# Patient Record
Sex: MALE | Race: WHITE | Employment: OTHER | URBAN - METROPOLITAN AREA
[De-identification: names, ages, dates, MRNs, and addresses within clinical notes are randomized per-mention and may not be internally consistent; named-entity substitution may affect disease eponyms.]

---

## 2017-01-05 ENCOUNTER — OFFICE VISIT (OUTPATIENT)
Dept: UROLOGY | Facility: CLINIC | Age: 67
End: 2017-01-05

## 2017-01-05 VITALS
SYSTOLIC BLOOD PRESSURE: 131 MMHG | DIASTOLIC BLOOD PRESSURE: 77 MMHG | HEART RATE: 100 BPM | WEIGHT: 219 LBS | HEIGHT: 70 IN | BODY MASS INDEX: 31.35 KG/M2

## 2017-01-05 DIAGNOSIS — C61 MALIGNANT NEOPLASM OF PROSTATE (H): Primary | ICD-10-CM

## 2017-01-05 ASSESSMENT — PAIN SCALES - GENERAL: PAINLEVEL: NO PAIN (0)

## 2017-01-05 NOTE — PROGRESS NOTES
Chief Complaint:    Localized Prostate Cancer         Consult or Referral:     Mr. David Astudillo is a 66 year old male seen in consultation from Dr. Vargas.         History of Present Illness:    David Astudillo is a very pleasant 66 year old male who presents with a history of Prostate Cancer.   The clinical stage is cT1c, initial PSA was 6 and biopsy jorge score is 3+3 in 1 of 12 cores.  Volume of core(s) affected by prostate cancer ranged from 3%.  The prostate size was measured and estimated to be to be 46 grams.  PSA density is 0.13.             Past Medical History:     Past Medical History   Diagnosis Date     Cardiomyopathy, idiopathic (H) 11/6/2012     ICD (implantable cardiac defibrillator), dual, in situ 11/6/2012     CRT-D 99% V-pacing     Paroxysmal VT (H) 11/6/2012     History of VT ablation x2     Stroke (H)      2004     VALE (obstructive sleep apnea)      Anxiety      Dyslipidemia      GERD (gastroesophageal reflux disease)      Atrial fibrillation (H)      Paroxysmal     Thyroid nodule      awaiting FNA     Nonischemic cardiomyopathy (H)      LVAD (left ventricular assist device) present (H)             Past Surgical History:     Past Surgical History   Procedure Laterality Date     Insert ventricular assist device left (heartmate ii)  11/21/2012     Procedure: INSERT VENTRICULAR ASSIST DEVICE LEFT (HEARTMATE II);  Median Sternotomy, Insert Heartmate II Left Ventricular Assist Device, On-pump oxygenator, Intraoperative Transesophageal Echocardiogram;  Surgeon: Chris Paulino MD;  Location: [facility]     Irrigation and debridement chest washout, combined  11/14/2013     Procedure: COMBINED IRRIGATION AND DEBRIDEMENT CHEST WASHOUT;  Irrigation and Debridement of Chest Wound;  Surgeon: Chris Paulino MD;  Location: [facility]     Esophagoscopy, gastroscopy, duodenoscopy (egd), combined  11/21/2013     Procedure: COMBINED ESOPHAGOSCOPY, GASTROSCOPY, DUODENOSCOPY (EGD), REMOVE FOREIGN BODY;;  Surgeon:  Mati Lala MD;  Location: [facility]     Incision and drainage sternum w/ irrigation system, combined  3/13/2014     Procedure: COMBINED INCISION AND DRAINAGE STERNUM W/ IRRIGATION SYSTEM;  Incision and Drainage of Abdominal Abcess;  Surgeon: Chris Paulino MD;  Location: [facility]     Irrigation and debridement trunk, combined  2014     Procedure: COMBINED IRRIGATION AND DEBRIDEMENT TRUNK;  Irrigation and Debridement of Sternal Wound;  Surgeon: Chris Paulino MD;  Location: [facility]     Implant automatic implantable cardioverter defibrillator              Medications     Current Outpatient Prescriptions   Medication     levofloxacin (LEVAQUIN) 500 MG tablet     Tacrolimus (PROGRAF PO)     mycophenolate (CELLCEPT - GENERIC EQUIVALENT) 500 MG tablet     PROGRAF 1 MG PO CAPSULE     sulfamethoxazole-trimethoprim (BACTRIM) 400-80 MG per tablet     CELLCEPT 500 MG PO TABLET     PRAVASTATIN SODIUM PO     ALPRAZolam (XANAX) 0.5 MG tablet     zolpidem (AMBIEN) 10 MG tablet     Multiple Vitamin (MULTIVITAMINS PO)     aspirin EC 81 MG tablet     loratadine (CLARITIN) 10 MG tablet     No current facility-administered medications for this visit.            Family History:     Family History   Problem Relation Age of Onset     Thyroid Disease No family hx of      Heart Failure       aunt, also had ICD,  at age 82     Lupus No family hx of      DIABETES No family hx of      Parkinsonism Father             Social History:     Social History     Social History     Marital Status:      Spouse Name: N/A     Number of Children: N/A     Years of Education: N/A     Occupational History     Not on file.     Social History Main Topics     Smoking status: Former Smoker -- 1.00 packs/day for 31 years     Types: Cigarettes     Start date: 1973     Quit date: 2004     Smokeless tobacco: Former User     Alcohol Use: No      Comment: a glass of wine with dinner sometimes     Drug Use: No     Sexual Activity: Not on file  "    Other Topics Concern     Not on file     Social History Narrative            Allergies:   Review of patient's allergies indicates no known allergies.         Review of Systems:  From intake questionnaire     Negative 14 system review except as noted on HPI, nurse's note.         Physical Exam:     Patient is a 66 year old  male   Vitals: Blood pressure 131/77, pulse 100, height 1.778 m (5' 10\"), weight 99.338 kg (219 lb).  General Appearance Adult: Alert, no acute distress, oriented  HENT: throat/mouth:normal, good dentition  Lungs: no respiratory distress, or pursed lip breathing  Heart: No obvious jugular venous distension present  Abdomen: soft, nontender, no organomegaly or masses, Body mass index is 31.42 kg/(m^2).      Labs and Pathology:    I reviewed all applicable laboratory and pathology data and went over findings with patient  Significant for   PSA   Date Value Ref Range Status   11/10/2016 6.03* 0 - 4 ug/L Final     Comment:     Assay Method:  Chemiluminescence using Siemens Vista analyzer   11/16/2012 1.11 0 - 4 ug/L Final     Comment:     PSA results are about 7% lower than our prior method due to a methodology   change   on August 30, 2011.           Imaging:    I reviewed all applicable imaging and went over findings with patient.  Significant for n/a           Assessment and Plan:     Assessment:   Localized Prostate Cancer    Plan:  We had an extensive discussion about the significance of localized, prostate cancer.  His estimated risk of extraprostatic disease is less than 2% therefore no additional diagnostic imaging is indicated at this time.     We discussed the options for treatment of a localized prostate cancer including active surveillance (reviewing the [place] experience), brachytherapy, external beam, and  proton beam radiation therapy, as well as surgical extirpation.  We briefly mentioned cryotherapy, but the results are not great in the primary setting and they almost uniformly " lead to loss of erections.  We discussed the fact that all prostate cancer treatments leads to the loss or decrease in sexual function.  This loss usually occurs immediately with surgery and then improves as the patient heals and with radiation there is usually no effect, then it drops off at a faster than expected pace such that 2-3 years down the road, the number of men suffering from ED after treatment for their prostate cancer is approximately equal.    We also discussed the advantages and disadvantages and roles of open surgery vs. laparoscopic (and Da Luna assisted) surgery.  I noted that though robotic surgery has been associated with shorter hospitalization, shorter time to complete recovery, fewer blood transfusions and lower risk of bladder neck contractures, in the most important domains, cancer control, preservation of urinary function and preservation of sexual function, the outcomes have been quite comparable.    Given the low risk 3+3 and small volume of his prostate cancer 5% of one core.  Some physicians debate whether this should still be called prostate cancer due to the low risk nature.  There is a 15 year 97% life expectancy without active treatment at this time.    We did discuss treatment options.    The anticipated post-operative course was explained, including the anticipated hospital stay.  With surgery we discussed the need for a catheter post-operatively for between 7-10 days to serve as a scaffolding for the bladder neck to heal to the urethra.  We also discussed the risk of post operative urinary incontinence once the cathter is removed.  We discussed that we would recommend pelvic floor physical therapy and that sometimes urinary recovery takes several months to up to a year to recover control.  Approximately 90-95% of men are continent at one year after surgery, but most men describe that their continence is different after surgery.    I suggested a referral to radiation oncology  which I believe is helpful to get adequate information to make the best decision that is best for the patient.  He is not interested in a radiation consult because he's leaning heavily towards Active surveillance.  We discussed national survey results suggesting that for identical patients, surgeons and radiation oncologists suggest their respective method of treating prostate cancer as the most appropriate the majority of the time.  But for most cases of prostate cancer there appears to be clinical equipoise between these approaches and this allows the patient preferences to be expressed.    Patient has decided he would like to proceed with active surveillance including a MRI in 6 months along with a PSA. Would also plan an additional biopsy in 1 year.    F/U:   In june      Adalid Selby MD  Department of Urology Staff  [facility]  Patient Care Team:  Amadeo Vargas MD as PCP - General (Cardiology)  Ann Lezama MSW as   Aleah Coronado, RN as VAD Coordinator (Nurse)  Dhruv Yao MD as MD (Clinical Cardiac Electrophysiology)  Kaitlynn Lester, ROLLY as Nurse Coordinator (Clinical Cardiac Electrophysiology)  Candis Cuadra MD as Cardiologist (Cardiology)  Arpita Madden RN as Registered Nurse (Cardiology)  Adalid Selby MD as MD (Urology)  AMADEO VARGAS    Copy to patient  JAMIE WELLINGTON  [address] [facility] [place] [address]

## 2017-01-05 NOTE — PATIENT INSTRUCTIONS
Follow up with Dr. Selby in 6 months with prostate MRI.    It was a pleasure meeting with you today.  Thank you for allowing me and my team the privilege of caring for you today.  YOU are the reason we are here, and I truly hope we provided you with the excellent service you deserve.  Please let us know if there is anything else we can do for you so that we can be sure you are leaving completely satisfied with your care experience.      JÚNIOR Marroquin

## 2017-01-05 NOTE — NURSING NOTE
Chief Complaint   Patient presents with     RECHECK     discuss results of prostate biopsy        Ann Tran MA

## 2017-01-05 NOTE — Clinical Note
1/5/2017       RE: David Astudillo  [address]  [place] [facility] [address]     Dear Colleague,    Thank you for referring your patient, David Astudillo, to the [facility] UROLOGY AND INST FOR PROSTATE AND UROLOGIC CANCERS at [facility]. Please see a copy of my visit note below.          Chief Complaint:    Localized Prostate Cancer         Consult or Referral:     Mr. David Astudillo is a 66 year old male seen in consultation from Dr. Vargas.         History of Present Illness:    David Astudillo is a very pleasant 66 year old male who presents with a history of Prostate Cancer.   The clinical stage is cT1c, initial PSA was 6 and biopsy jorge score is 3+3 in 1 of 12 cores.  Volume of core(s) affected by prostate cancer ranged from 3%.  The prostate size was measured and estimated to be to be 46 grams.  PSA density is 0.13.             Past Medical History:     Past Medical History   Diagnosis Date     Cardiomyopathy, idiopathic (H) 11/6/2012     ICD (implantable cardiac defibrillator), dual, in situ 11/6/2012     CRT-D 99% V-pacing     Paroxysmal VT (H) 11/6/2012     History of VT ablation x2     Stroke (H)      2004     VALE (obstructive sleep apnea)      Anxiety      Dyslipidemia      GERD (gastroesophageal reflux disease)      Atrial fibrillation (H)      Paroxysmal     Thyroid nodule      awaiting FNA     Nonischemic cardiomyopathy (H)      LVAD (left ventricular assist device) present (H)             Past Surgical History:     Past Surgical History   Procedure Laterality Date     Insert ventricular assist device left (heartmate ii)  11/21/2012     Procedure: INSERT VENTRICULAR ASSIST DEVICE LEFT (HEARTMATE II);  Median Sternotomy, Insert Heartmate II Left Ventricular Assist Device, On-pump oxygenator, Intraoperative Transesophageal Echocardiogram;  Surgeon: Chris Paulino MD;  Location: [facility]     Irrigation and debridement chest washout, combined  11/14/2013     Procedure:  COMBINED IRRIGATION AND DEBRIDEMENT CHEST WASHOUT;  Irrigation and Debridement of Chest Wound;  Surgeon: Chris Paulino MD;  Location: [facility]     Esophagoscopy, gastroscopy, duodenoscopy (egd), combined  2013     Procedure: COMBINED ESOPHAGOSCOPY, GASTROSCOPY, DUODENOSCOPY (EGD), REMOVE FOREIGN BODY;;  Surgeon: Mati Lala MD;  Location: [facility]     Incision and drainage sternum w/ irrigation system, combined  3/13/2014     Procedure: COMBINED INCISION AND DRAINAGE STERNUM W/ IRRIGATION SYSTEM;  Incision and Drainage of Abdominal Abcess;  Surgeon: Chris Paulino MD;  Location: [facility]     Irrigation and debridement trunk, combined  2014     Procedure: COMBINED IRRIGATION AND DEBRIDEMENT TRUNK;  Irrigation and Debridement of Sternal Wound;  Surgeon: Chris Paulino MD;  Location: [facility]     Implant automatic implantable cardioverter defibrillator              Medications     Current Outpatient Prescriptions   Medication     levofloxacin (LEVAQUIN) 500 MG tablet     Tacrolimus (PROGRAF PO)     mycophenolate (CELLCEPT - GENERIC EQUIVALENT) 500 MG tablet     PROGRAF 1 MG PO CAPSULE     sulfamethoxazole-trimethoprim (BACTRIM) 400-80 MG per tablet     CELLCEPT 500 MG PO TABLET     PRAVASTATIN SODIUM PO     ALPRAZolam (XANAX) 0.5 MG tablet     zolpidem (AMBIEN) 10 MG tablet     Multiple Vitamin (MULTIVITAMINS PO)     aspirin EC 81 MG tablet     loratadine (CLARITIN) 10 MG tablet     No current facility-administered medications for this visit.            Family History:     Family History   Problem Relation Age of Onset     Thyroid Disease No family hx of      Heart Failure       aunt, also had ICD,  at age 82     Lupus No family hx of      DIABETES No family hx of      Parkinsonism Father             Social History:     Social History     Social History     Marital Status:      Spouse Name: N/A     Number of Children: N/A     Years of Education: N/A     Occupational History     Not on file.     Social  "History Main Topics     Smoking status: Former Smoker -- 1.00 packs/day for 31 years     Types: Cigarettes     Start date: 01/01/1973     Quit date: 01/01/2004     Smokeless tobacco: Former User     Alcohol Use: No      Comment: a glass of wine with dinner sometimes     Drug Use: No     Sexual Activity: Not on file     Other Topics Concern     Not on file     Social History Narrative            Allergies:   Review of patient's allergies indicates no known allergies.         Review of Systems:  From intake questionnaire     Negative 14 system review except as noted on HPI, nurse's note.         Physical Exam:     Patient is a 66 year old  male   Vitals: Blood pressure 131/77, pulse 100, height 1.778 m (5' 10\"), weight 99.338 kg (219 lb).  General Appearance Adult: Alert, no acute distress, oriented  HENT: throat/mouth:normal, good dentition  Lungs: no respiratory distress, or pursed lip breathing  Heart: No obvious jugular venous distension present  Abdomen: soft, nontender, no organomegaly or masses, Body mass index is 31.42 kg/(m^2).      Labs and Pathology:    I reviewed all applicable laboratory and pathology data and went over findings with patient  Significant for   PSA   Date Value Ref Range Status   11/10/2016 6.03* 0 - 4 ug/L Final     Comment:     Assay Method:  Chemiluminescence using Siemens Vista analyzer   11/16/2012 1.11 0 - 4 ug/L Final     Comment:     PSA results are about 7% lower than our prior method due to a methodology   change   on August 30, 2011.           Imaging:    I reviewed all applicable imaging and went over findings with patient.  Significant for n/a           Assessment and Plan:     Assessment:   Localized Prostate Cancer    Plan:  We had an extensive discussion about the significance of localized, prostate cancer.  His estimated risk of extraprostatic disease is less than 2% therefore no additional diagnostic imaging is indicated at this time.     We discussed the options for " treatment of a localized prostate cancer including active surveillance (reviewing the [place] experience), brachytherapy, external beam, and  proton beam radiation therapy, as well as surgical extirpation.  We briefly mentioned cryotherapy, but the results are not great in the primary setting and they almost uniformly lead to loss of erections.  We discussed the fact that all prostate cancer treatments leads to the loss or decrease in sexual function.  This loss usually occurs immediately with surgery and then improves as the patient heals and with radiation there is usually no effect, then it drops off at a faster than expected pace such that 2-3 years down the road, the number of men suffering from ED after treatment for their prostate cancer is approximately equal.    We also discussed the advantages and disadvantages and roles of open surgery vs. laparoscopic (and Da Luna assisted) surgery.  I noted that though robotic surgery has been associated with shorter hospitalization, shorter time to complete recovery, fewer blood transfusions and lower risk of bladder neck contractures, in the most important domains, cancer control, preservation of urinary function and preservation of sexual function, the outcomes have been quite comparable.    Given the low risk 3+3 and small volume of his prostate cancer 5% of one core.  Some physicians debate whether this should still be called prostate cancer due to the low risk nature.  There is a 15 year 97% life expectancy without active treatment at this time.    We did discuss treatment options.    The anticipated post-operative course was explained, including the anticipated hospital stay.  With surgery we discussed the need for a catheter post-operatively for between 7-10 days to serve as a scaffolding for the bladder neck to heal to the urethra.  We also discussed the risk of post operative urinary incontinence once the cathter is removed.  We discussed that we would  recommend pelvic floor physical therapy and that sometimes urinary recovery takes several months to up to a year to recover control.  Approximately 90-95% of men are continent at one year after surgery, but most men describe that their continence is different after surgery.    I suggested a referral to radiation oncology which I believe is helpful to get adequate information to make the best decision that is best for the patient.  He is not interested in a radiation consult because he's leaning heavily towards Active surveillance.  We discussed national survey results suggesting that for identical patients, surgeons and radiation oncologists suggest their respective method of treating prostate cancer as the most appropriate the majority of the time.  But for most cases of prostate cancer there appears to be clinical equipoise between these approaches and this allows the patient preferences to be expressed.    Patient has decided he would like to proceed with active surveillance including a MRI in 6 months along with a PSA. Would also plan an additional biopsy in 1 year.    F/U:   In june      Adalid Selby MD  Department of Urology Staff  [facility]  Patient Care Team:  Amadeo Vargas MD as PCP - General (Cardiology)  Ann Lezama MSW as   Aleah Coronado, RN as VAD Coordinator (Nurse)  Dhruv Yao MD as MD (Clinical Cardiac Electrophysiology)  Kaitlynn Lester, ROLLY as Nurse Coordinator (Clinical Cardiac Electrophysiology)  Candis Cuadra MD as Cardiologist (Cardiology)  Arpita Madden, RN as Registered Nurse (Cardiology)  Adalid Selby MD as MD (Urology)  AMADEO VARGAS    Copy to patient  JAMIE WELLINGTON  [address] [facility] [place] [id number]

## 2017-01-05 NOTE — MR AVS SNAPSHOT
After Visit Summary   1/5/2017    David Astudillo    MRN: [id number]           Patient Information     Date Of Birth          1950        Visit Information        Provider Department      1/5/2017 11:30 AM Adalid Selby MD [facility] Urology and [facility] for Prostate and Urologic Cancers        Today's Diagnoses     Malignant neoplasm of prostate (H)    -  1       Care Instructions    Follow up with Dr. Selby in 6 months with prostate MRI.    It was a pleasure meeting with you today.  Thank you for allowing me and my team the privilege of caring for you today.  YOU are the reason we are here, and I truly hope we provided you with the excellent service you deserve.  Please let us know if there is anything else we can do for you so that we can be sure you are leaving completely satisfied with your care experience.      JÚNIOR Marroquin        Follow-ups after your visit        Your next 10 appointments already scheduled     Jul 06, 2017 10:30 AM   LAB with  LAB   [facility] ([facility])    [address] [place] [address] [phone]           Patient must bring picture ID.  Patient should be prepared to give a urine specimen  Please do not eat 10-12 hours before your appointment if you are coming in fasting for labs on lipids, cholesterol, or glucose (sugar).  Pregnant women should follow their Care Team instructions. Water with medications is okay. Do not drink coffee or other fluids.   If you have concerns about taking  your medications, please ask at office or if scheduling via [employer], send a message by clicking on Secure Messaging, Message Your Care Team.            Jul 06, 2017 10:45 AM   (Arrive by 10:30 AM)   MR PROSTATE with [id number]   [facility] MRI ([facility])    [address] [place] [address] [phone]           Take your medicines as usual, unless  your doctor tells you not to. Bring a list of your current medicines to your exam (including vitamins, minerals and over-the-counter drugs). Also bring the results of similar scans you may have had.  Please remove any body piercings and hair extensions before you arrive.  Follow your doctor s orders. If you do not, we may have to postpone your exam.  You will not have contrast for this exam. You do not need to do anything special to prepare.  The MRI machine uses a strong magnet. Please wear clothes without metal (snaps, zippers). A sweatsuit works well, or we may give you a hospital gown.   **IMPORTANT** THE INSTRUCTIONS BELOW ARE ONLY FOR THOSE PATIENTS WHO HAVE BEEN TOLD THEY WILL RECEIVE SEDATION OR GENERAL ANESTHESIA DURING THEIR MRI PROCEDURE:  IF YOU WILL RECEIVE SEDATION (take medicine to help you relax during your exam):   You must get the medicine from your doctor before you arrive. Bring the medicine to the exam. Do not take it at home.   Arrive one hour early. Bring someone who can take you home after the test. Your medicine will make you sleepy. After the exam, you may not drive, take a bus or take a taxi by yourself.   No eating 8 hours before your exam. You may have clear liquids up until 4 hours before your exam. (Clear liquids include water, clear tea, black coffee and fruit juice without pulp.)  IF YOU WILL RECEIVE ANESTHESIA (be asleep for your exam):   Arrive 1 1/2 hours early. Bring someone who can take you home after the test. You may not drive, take a bus or take a taxi by yourself.   No eating 8 hours before your exam. You may have clear liquids up until 4 hours before your exam. (Clear liquids include water, clear tea, black coffee and fruit juice without pulp.)   You will spend four to five hours in the recovery room.  Please call the Imaging Department at your exam site with any questions.            Jul 06, 2017  1:45 PM   (Arrive by 1:30 PM)   Return Visit with Adalid Selby,  MD   [facility] Urology and [facility] for Prostate and Urologic Cancers ([facility])    [address] [facility]  [address] [facility] [place] [address] [phone]              Future tests that were ordered for you today     Open Standing Orders        Priority Remaining Interval Expires Ordered    PSA tumor marker Routine 20/20 1/6/2023 1/5/2017          Open Future Orders        Priority Expected Expires Ordered    MRI Prostate Routine  1/5/2018 1/5/2017            Who to contact     Please call your clinic at [phone] to:    Ask questions about your health    Make or cancel appointments    Discuss your medicines    Learn about your test results    Speak to your doctor   If you have compliments or concerns about an experience at your clinic, or if you wish to file a complaint, please contact [facility] Patient Relations at [phone] or email us at [email or web address]         Additional Information About Your Visit        [employer] Information     [employer] gives you secure access to your electronic health record. If you see a primary care provider, you can also send messages to your care team and make appointments. If you have questions, please call your primary care clinic.  If you do not have a primary care provider, please call [phone] and they will assist you.      [employer]i is an electronic gateway that provides easy, online access to your medical records. With [employer], you can request a clinic appointment, read your test results, renew a prescription or communicate with your care team.     To access your existing account, please contact your [facility] or call [phone] for assistance.        Care EveryWhere ID     This is your Care EveryWhere ID. This could be used by other organizations to access your [place] medical records  [id number]        Your Vitals Were     Pulse Height BMI (Body Mass Index)           "   100 1.778 m (5' 10\") 31.42 kg/m2          Blood Pressure from Last 3 Encounters:   01/05/17 131/77   12/22/16 136/88   11/10/16 143/79    Weight from Last 3 Encounters:   01/05/17 99.338 kg (219 lb)   12/22/16 98.884 kg (218 lb)   11/10/16 102.513 kg (226 lb)               Primary Care Provider Office Phone # Fax #    Joslyn R Reyes, PA-C [phone]       [facility] [address] [facility] [address]        Thank you!     Thank you for choosing [facility] UROLOGY AND [facility] FOR PROSTATE AND UROLOGIC CANCERS  for your care. Our goal is always to provide you with excellent care. Hearing back from our patients is one way we can continue to improve our services. Please take a few minutes to complete the written survey that you may receive in the mail after your visit with us. Thank you!             Your Updated Medication List - Protect others around you: Learn how to safely use, store and throw away your medicines at www.disposemymeds.org.          This list is accurate as of: 1/5/17 12:40 PM.  Always use your most recent med list.                   Brand Name Dispense Instructions for use    ALPRAZolam 0.5 MG tablet    XANAX     Take 0.5 mg by mouth 3 times daily as needed       AMBIEN 10 MG tablet   Generic drug:  zolpidem      Take by mouth nightly as needed       aspirin EC 81 MG EC tablet      Take 1 tablet (81 mg) by mouth daily       BACTRIM 400-80 MG per tablet   Generic drug:  sulfamethoxazole-trimethoprim      Take Monday, Wednesday, and Friday       levofloxacin 500 MG tablet    LEVAQUIN    6 tablet    Take 1 tablet (500 mg) by mouth daily       loratadine 10 MG tablet    CLARITIN    30 tablet    Take 1 tablet by mouth daily.       MULTIVITAMINS PO      Take 1 tablet by mouth daily       * mycophenolate tablet      Take 500 mg by mouth 2 times daily       * mycophenolate 500 MG tablet    CELLCEPT - GENERIC EQUIVALENT     TK 1 T PO BID       PRAVASTATIN SODIUM PO      Take 40 " mg by mouth       * PROGRAF PO          * tacrolimus capsule     240 capsule    Take 2mg in the am and 1mg in the pm       * Notice:  This list has 4 medication(s) that are the same as other medications prescribed for you. Read the directions carefully, and ask your doctor or other care provider to review them with you.

## 2017-01-23 ENCOUNTER — TELEPHONE (OUTPATIENT)
Dept: TRANSPLANT | Facility: CLINIC | Age: 67
End: 2017-01-23

## 2017-01-23 NOTE — TELEPHONE ENCOUNTER
Pt is due for his routine 16 month transplant visit next month. During last discussion with wife, Syl, she was undecided where she would like pt to follow up permanently for his transplant care. Unable to get a hold of Syl so left a message with her. Called patient who reports that he will be having his transplant care followed by [facility] but understands that he can see Dr. Cuadra still, if needed or on an emergent basis. Will confirm with [facility] transplant office that they are now following patient. Will discuss with Dr. Cuadra and get back to patient. Pt verbalized understanding.

## 2017-02-20 LAB
Lab: 4.7
Lab: NORMAL

## 2017-02-22 ENCOUNTER — TELEPHONE (OUTPATIENT)
Dept: TRANSPLANT | Facility: CLINIC | Age: 67
End: 2017-02-22

## 2017-05-15 ENCOUNTER — TRANSFERRED RECORDS (OUTPATIENT)
Dept: HEALTH INFORMATION MANAGEMENT | Facility: CLINIC | Age: 67
End: 2017-05-15

## 2017-06-30 ENCOUNTER — PRE VISIT (OUTPATIENT)
Dept: UROLOGY | Facility: CLINIC | Age: 67
End: 2017-06-30

## 2017-06-30 DIAGNOSIS — C61 MALIGNANT NEOPLASM OF PROSTATE (H): Primary | ICD-10-CM

## 2017-07-06 ENCOUNTER — OFFICE VISIT (OUTPATIENT)
Dept: UROLOGY | Facility: CLINIC | Age: 67
End: 2017-07-06

## 2017-07-06 VITALS
HEIGHT: 70 IN | HEART RATE: 99 BPM | WEIGHT: 222 LBS | DIASTOLIC BLOOD PRESSURE: 88 MMHG | BODY MASS INDEX: 31.78 KG/M2 | SYSTOLIC BLOOD PRESSURE: 124 MMHG

## 2017-07-06 DIAGNOSIS — C61 MALIGNANT NEOPLASM OF PROSTATE (H): Primary | ICD-10-CM

## 2017-07-06 DIAGNOSIS — C61 MALIGNANT NEOPLASM OF PROSTATE (H): ICD-10-CM

## 2017-07-06 LAB — PSA SERPL-MCNC: 7.83 UG/L (ref 0–4)

## 2017-07-06 RX ORDER — SIROLIMUS 1 MG/1
TABLET, FILM COATED ORAL
COMMUNITY
Start: 2017-02-27

## 2017-07-06 ASSESSMENT — PAIN SCALES - GENERAL: PAINLEVEL: NO PAIN (0)

## 2017-07-06 NOTE — PROGRESS NOTES
David Astudillo is a very pleasant 67 year old  male who presents with a history of Prostate Cancer on active surveillance.     The clinical stage is cT1c, initial PSA was 6 and biopsy jorge score is 3+3 in 1 of 12 cores.  Volume of core(s) affected by prostate cancer ranged from 3%.  The prostate size was measured and estimated to be to be 46 grams.  PSA density is 0.13.    Cannot get MRI because of his heart ICD    PSA   Date Value Ref Range Status   07/06/2017 7.83 (H) 0 - 4 ug/L Final     Comment:     Assay Method:  Chemiluminescence using Siemens Vista analyzer   11/10/2016 6.03 (H) 0 - 4 ug/L Final     Comment:     Assay Method:  Chemiluminescence using Siemens Vista analyzer   11/16/2012 1.11 0 - 4 ug/L Final     Comment:     PSA results are about 7% lower than our prior method due to a methodology   change   on August 30, 2011.     A/P: David Astudillo is a 66 yo M with cT1c prostate cancer, [place] 3+3 in 1 of 12 cores. Unable to receive MRI scan today due to pacemaker. Patient does not endorse any new complaints.     - Plan for biopsy in October, will coordinate Rapamune dosing with transplant surgery  - No indication for imaging at this time     Adalid Selby MD  Department of Urology Staff  [facility]

## 2017-07-06 NOTE — LETTER
7/6/2017       RE: David Astudillo  [address]  [place] [address]     Dear Colleague,    Thank you for referring your patient, David Astudillo, to the [facility] UROLOGY AND INST FOR PROSTATE AND UROLOGIC CANCERS at [facility]. Please see a copy of my visit note below.    David Astudillo is a very pleasant 67 year old  male who presents with a history of Prostate Cancer on active surveillance.     The clinical stage is cT1c, initial PSA was 6 and biopsy jorge score is 3+3 in 1 of 12 cores.  Volume of core(s) affected by prostate cancer ranged from 3%.  The prostate size was measured and estimated to be to be 46 grams.  PSA density is 0.13.    Cannot get MRI because of his heart ICD    PSA   Date Value Ref Range Status   07/06/2017 7.83 (H) 0 - 4 ug/L Final     Comment:     Assay Method:  Chemiluminescence using Siemens Vista analyzer   11/10/2016 6.03 (H) 0 - 4 ug/L Final     Comment:     Assay Method:  Chemiluminescence using Siemens Vista analyzer   11/16/2012 1.11 0 - 4 ug/L Final     Comment:     PSA results are about 7% lower than our prior method due to a methodology   change   on August 30, 2011.     A/P: David Astudillo is a 68 yo M with cT1c prostate cancer, [place] 3+3 in 1 of 12 cores. Unable to receive MRI scan today due to pacemaker. Patient does not endorse any new complaints.     - Plan for biopsy in October, will coordinate Rapamune dosing with transplant surgery  - No indication for imaging at this time     Adalid Selby MD  Department of Urology Staff  [facility]

## 2017-07-06 NOTE — MR AVS SNAPSHOT
After Visit Summary   7/6/2017    David Astudillo    MRN: [id number]           Patient Information     Date Of Birth          1950        Visit Information        Provider Department      7/6/2017 1:20 PM Adalid Selby MD [facility] Urology and [facility] for Prostate and Urologic Cancers        Care Instructions    Follow up with Dr. Selby in October for prostate biopsy.    It was a pleasure meeting with you today.  Thank you for allowing me and my team the privilege of caring for you today.  YOU are the reason we are here, and I truly hope we provided you with the excellent service you deserve.  Please let us know if there is anything else we can do for you so that we can be sure you are leaving completely satisfied with your care experience.      Myla Duarte, [facility] for Prostate and Urologic Cancers  Preparation for Prostate Ultrasound and Biopsy    You have been scheduled for a prostate ultrasound with biopsies. A lubricated probe will be inserted into your rectum by your doctor. This equipment uses sound waves to produce an image or picture of the inside of the prostate gland. They will be able to measure the size of your prostate, look for any abnormalities in anatomy, and target any areas that may look suspicious for cancer before taking the samples (biopsies).  During this procedure the prostate will be injected with a numbing medication. This medication will not make you sleepy nor affect your ability to drive.    How to Prepare for the Procedure      On the day of the procedure eat and drink normally. Please do not fast or skip meals on the day of your procedure.      Please bring a list of your current medications to your appointment and take all regular medications as normal.      Do not take any of the following medications 7 days before your procedure:  - Anacin  - Bufferin  - Excedrin  - Ibuprofen  - Ecotrin   - Any other aspirin based products.    - Motrin  - Naprosyn  - Feldene  - Plavix  - Any other anti-inflammatory   medications      If you are taking any anticoagulation medications such as Coumadin, Jantoven, Warfarin, special instructions will need to be discussed.      You will be given an antibiotic the day of the procedure in the clinic.  *If patient had ? 2 prostate biopsies in the last 2 years, Gentamicin 80mg IM and Cipro 500 mg will be administered in the clinic prior to procedure*      You will need to purchase one Fleets enema (or equivalent).  This can be purchased at any pharmacy or grocery store and will be found in the laxative section. We ask that you give the enema to yourself approximately 2 hours before your appointment time.       The procedure takes about 30-45 minutes and will be done in the office. After the procedure you will be sent home with instructions.    Please call Urology/[place] for Prostate Clinic with any questions or concerns at [phone], press option # 3 to speak with a nurse.      As recommended by the American Urological Association Education and Research, Inc. article of 2007.          Follow-ups after your visit        Your next 10 appointments already scheduled     Jul 06, 2017  1:20 PM CDT   (Arrive by 1:05 PM)   Return Visit with Adalid Selby MD   [facility] Urology and [facility] for Prostate and Urologic Cancers ([facility])    [address] [place] [address] [phone]            Oct 05, 2017  8:20 AM CDT   (Arrive by 8:05 AM)   Sonography/Biopsy with Adalid Selby MD   [facility] Urology and [facility] for Prostate and Urologic Cancers ([facility])    [address] [place] [address] [phone]              Who to contact     Please call your clinic at [phone] to:    Ask questions about your health    Make or cancel appointments    Discuss your medicines    Learn about your test  "results    Speak to your doctor   If you have compliments or concerns about an experience at your clinic, or if you wish to file a complaint, please contact [facility] Patient Relations at [phone] or email us at [email or web address]         Additional Information About Your Visit        [employer] Information     [employer] gives you secure access to your electronic health record. If you see a primary care provider, you can also send messages to your care team and make appointments. If you have questions, please call your primary care clinic.  If you do not have a primary care provider, please call [phone] and they will assist you.      [employer] is an electronic gateway that provides easy, online access to your medical records. With [employer], you can request a clinic appointment, read your test results, renew a prescription or communicate with your care team.     To access your existing account, please contact your [facility] or call [phone] for assistance.        Care EveryWhere ID     This is your Care EveryWhere ID. This could be used by other organizations to access your [place] medical records  [id number]        Your Vitals Were     Pulse Height BMI (Body Mass Index)             99 1.778 m (5' 10\") 31.85 kg/m2          Blood Pressure from Last 3 Encounters:   07/06/17 124/88   01/05/17 131/77   12/22/16 136/88    Weight from Last 3 Encounters:   07/06/17 100.7 kg (222 lb)   01/05/17 99.3 kg (219 lb)   12/22/16 98.9 kg (218 lb)              Today, you had the following     No orders found for display         Today's Medication Changes          These changes are accurate as of: 7/6/17 12:55 PM.  If you have any questions, ask your nurse or doctor.               These medicines have changed or have updated prescriptions.        Dose/Directions    tacrolimus capsule   This may have changed:  Another medication with the same name was " removed. Continue taking this medication, and follow the directions you see here.   Changed by:  Ludmila Louie NP        Take 2mg in the am and 1mg in the pm   Quantity:  240 capsule   Refills:  0         Stop taking these medicines if you haven't already. Please contact your care team if you have questions.     BACTRIM 400-80 MG per tablet   Generic drug:  sulfamethoxazole-trimethoprim   Stopped by:  Adalid Selby MD           levofloxacin 500 MG tablet   Commonly known as:  LEVAQUIN   Stopped by:  Adalid Selby MD           mycophenolate 500 MG tablet   Commonly known as:  CELLCEPT - GENERIC EQUIVALENT   Stopped by:  Adalid Selby MD           mycophenolate tablet   Stopped by:  Adalid Selby MD                    Primary Care Provider Office Phone # Fax #    Joslyn R Reyes, PA-C [phone]       [facility] [address]  [facility] [place] [facility] [place] [address]        Equal Access to Services     SHERRIE MCCANN : Hadii aad ku hadasho Soomaali, waaxda luqadaha, qaybta kaalmada adeegyada, waxay abelardoin hayshirley whiting . So [facility] [phone].    ATENCIÓN: Si habla español, tiene a vidal disposición servicios gratuitos de asistencia lingüística. Franklyn al [phone].    We comply with applicable federal civil rights laws and [place] laws. We do not discriminate on the basis of race, color, national origin, age, disability sex, sexual orientation or gender identity.            Thank you!     Thank you for choosing [facility] UROLOGY AND [facility] FOR PROSTATE AND UROLOGIC CANCERS  for your care. Our goal is always to provide you with excellent care. Hearing back from our patients is one way we can continue to improve our services. Please take a few minutes to complete the written survey that you may receive in the mail after your visit with us. Thank you!             Your Updated Medication List - Protect others around you: Learn how to safely use,  store and throw away your medicines at www.disposemymeds.org.          This list is accurate as of: 7/6/17 12:55 PM.  Always use your most recent med list.                   Brand Name Dispense Instructions for use Diagnosis    ALPRAZolam 0.5 MG tablet    XANAX     Take 0.5 mg by mouth 3 times daily as needed        AMBIEN 10 MG tablet   Generic drug:  zolpidem      Take by mouth nightly as needed        aspirin EC 81 MG EC tablet      Take 1 tablet (81 mg) by mouth daily    Chronic systolic heart failure (H)       DAPSONE PO      Take 100 mg by mouth        loratadine 10 MG tablet    CLARITIN    30 tablet    Take 1 tablet by mouth daily.    Congestion       MULTIVITAMINS PO      Take 1 tablet by mouth daily        PRAVASTATIN SODIUM PO      Take 40 mg by mouth        sirolimus 1 MG tablet    RAPAMUNE - GENERIC EQUIVALENT     TK ONE T PO ONCE D AT NOON.        tacrolimus capsule     240 capsule    Take 2mg in the am and 1mg in the pm

## 2017-07-06 NOTE — PATIENT INSTRUCTIONS
Follow up with Dr. Selby in October for prostate biopsy.    It was a pleasure meeting with you today.  Thank you for allowing me and my team the privilege of caring for you today.  YOU are the reason we are here, and I truly hope we provided you with the excellent service you deserve.  Please let us know if there is anything else we can do for you so that we can be sure you are leaving completely satisfied with your care experience.      Myla Duarte, [facility]          [place] for Prostate and Urologic Cancers  Preparation for Prostate Ultrasound and Biopsy    You have been scheduled for a prostate ultrasound with biopsies. A lubricated probe will be inserted into your rectum by your doctor. This equipment uses sound waves to produce an image or picture of the inside of the prostate gland. They will be able to measure the size of your prostate, look for any abnormalities in anatomy, and target any areas that may look suspicious for cancer before taking the samples (biopsies).  During this procedure the prostate will be injected with a numbing medication. This medication will not make you sleepy nor affect your ability to drive.    How to Prepare for the Procedure      On the day of the procedure eat and drink normally. Please do not fast or skip meals on the day of your procedure.      Please bring a list of your current medications to your appointment and take all regular medications as normal.      Do not take any of the following medications 7 days before your procedure:  - Anacin  - Bufferin  - Excedrin  - Ibuprofen  - Ecotrin   - Any other aspirin based products.   - Motrin  - Naprosyn  - Feldene  - Plavix  - Any other anti-inflammatory   medications      If you are taking any anticoagulation medications such as Coumadin, Jantoven, Warfarin, special instructions will need to be discussed.      You will be given an antibiotic the day of the procedure in the clinic.  *If patient had ? 2 prostate biopsies in the  last 2 years, Gentamicin 80mg IM and Cipro 500 mg will be administered in the clinic prior to procedure*      You will need to purchase one Fleets enema (or equivalent).  This can be purchased at any pharmacy or grocery store and will be found in the laxative section. We ask that you give the enema to yourself approximately 2 hours before your appointment time.       The procedure takes about 30-45 minutes and will be done in the office. After the procedure you will be sent home with instructions.    Please call Urology/[facility] with any questions or concerns at [phone], press option # 3 to speak with a nurse.      As recommended by the American Urological Association Education and Research, Inc. article of 2007.

## 2017-07-11 PROBLEM — C61 MALIGNANT NEOPLASM OF PROSTATE (H): Status: ACTIVE | Noted: 2017-07-11

## 2017-09-26 ENCOUNTER — PRE VISIT (OUTPATIENT)
Dept: UROLOGY | Facility: CLINIC | Age: 67
End: 2017-09-26

## 2017-10-04 ENCOUNTER — TELEPHONE (OUTPATIENT)
Dept: UROLOGY | Facility: CLINIC | Age: 67
End: 2017-10-04

## 2017-10-04 DIAGNOSIS — N39.0 URINARY TRACT INFECTION: Primary | ICD-10-CM

## 2017-10-04 RX ORDER — CIPROFLOXACIN 500 MG/1
500 TABLET, FILM COATED ORAL 2 TIMES DAILY
Qty: 6 TABLET | Refills: 0 | Status: SHIPPED | OUTPATIENT
Start: 2017-10-04

## 2017-10-04 NOTE — TELEPHONE ENCOUNTER
Patient called and asked about the prep for tomorrow  He has a long medical history with heart transplant  Had repeat biopsy last December and will need repeat.  He will call his heart transplant MD's to determine if he should take cipro..  Patient stated he had levaquin last year and several.  Message sent to weight. Ashley Knutson LPN Staff Nurse

## 2017-10-05 ENCOUNTER — OFFICE VISIT (OUTPATIENT)
Dept: UROLOGY | Facility: CLINIC | Age: 67
End: 2017-10-05

## 2017-10-05 VITALS
HEART RATE: 109 BPM | SYSTOLIC BLOOD PRESSURE: 144 MMHG | BODY MASS INDEX: 31.73 KG/M2 | HEIGHT: 70 IN | WEIGHT: 221.6 LBS | DIASTOLIC BLOOD PRESSURE: 104 MMHG

## 2017-10-05 DIAGNOSIS — C61 MALIGNANT NEOPLASM OF PROSTATE (H): Primary | ICD-10-CM

## 2017-10-05 PROCEDURE — 88305 TISSUE EXAM BY PATHOLOGIST: CPT | Performed by: UROLOGY

## 2017-10-05 RX ORDER — TRIAMCINOLONE ACETONIDE 1 MG/G
CREAM TOPICAL
Refills: 0 | COMMUNITY
Start: 2017-09-16 | End: 2018-06-01

## 2017-10-05 RX ORDER — SULFAMETHOXAZOLE AND TRIMETHOPRIM 400; 80 MG/1; MG/1
TABLET ORAL
Refills: 3 | COMMUNITY
Start: 2017-01-10

## 2017-10-05 RX ORDER — TACROLIMUS 1 MG/1
CAPSULE, GELATIN COATED ORAL
COMMUNITY
End: 2018-06-01

## 2017-10-05 RX ORDER — MYCOPHENOLATE MOFETIL 500 MG/1
TABLET ORAL
Refills: 3 | COMMUNITY
Start: 2017-03-31

## 2017-10-05 RX ORDER — TRIAMCINOLONE ACETONIDE 1 MG/G
CREAM TOPICAL
COMMUNITY
Start: 2017-09-16

## 2017-10-05 ASSESSMENT — PAIN SCALES - GENERAL
PAINLEVEL: NO PAIN (0)
PAINLEVEL: NO PAIN (0)

## 2017-10-05 NOTE — LETTER
10/5/2017       RE: David Astudillo  [address]  [place] [address]     Dear Colleague,    Thank you for referring your patient, David Astudillo, to the [facility] UROLOGY AND INST FOR PROSTATE AND UROLOGIC CANCERS at [facility]. Please see a copy of my visit note below.    David Astudillo is here for a transrectal US guided biopsy of the prostate for follow up on active surveillance    History of prostate cancer with    The clinical stage is cT1c, initial PSA was 6 and biopsy jorge score is 3+3 in 1 of 12 cores.  Volume of core(s) affected by prostate cancer ranged from 3%.  The prostate size was measured and estimated to be to be 46 grams.  PSA density is 0.13.    Last biopsy was 12/2016, cannot get MRI because of some heart wires after a heart transplant     The risks, benefits, alternatives, and personnel involved in the procudure were discussed.  All questions were answered.  A written informed consent was obtained.      PSA   Date Value Ref Range Status   07/06/2017 7.83 (H) 0 - 4 ug/L Final     Comment:     Assay Method:  Chemiluminescence using Siemens Vista analyzer   11/10/2016 6.03 (H) 0 - 4 ug/L Final     Comment:     Assay Method:  Chemiluminescence using Siemens Vista analyzer   11/16/2012 1.11 0 - 4 ug/L Final     Comment:     PSA results are about 7% lower than our prior method due to a methodology   change   on August 30, 2011.       An enema was completed and 500 mg of Cipro was given prior to the biopsy.  After obtaining informed consent patient was placed in lateral decubitus position.  The ultrasound probe was placed in the rectum.  The prostate was numbed using ultrasound guidance with 1% lidocaine 5 mls along each nerve bundle.  The volume was measured and estimated to be 38.2 grams.  US images were used to guide the biopsies of the prostate.  12 cores were taken with 6 on each side, 2 at the base,  2 at the midgland and  2 at the apex.  The  patient tolerated the procedure well.      We will follow up with the results in 7-10 days and contact patient with these results.      Scribe Disclosure:   I,Kirsty Lomas, am serving as a scribe; to document services personally performed by Adalid Selby MD based on data collection and the provider's statements to me.     Adalid Selby MD  Department of Urology [facility]

## 2017-10-05 NOTE — NURSING NOTE
The following medication was given: Gentamicin    MEDICATION: Gentamicin  ROUTE: IM  SITE: RUQ - Gluteus  DOSE: 2 Ml  LOT #: [id number]  :  Ribbit  EXPIRATION DATE:  10/18

## 2017-10-05 NOTE — NURSING NOTE
Medication Wasted:  MEDICATION: Lidocaine 1%  LOT #: [id number]  EXPIRATION DATE:  06/2019  NDC#: 7187-7464-81   Drug Amount given = 10 mg  Drug Amount wasted = 10 mg

## 2017-10-05 NOTE — NURSING NOTE
Chief Complaint   Patient presents with     Biopsy     Prostate cancer follow up with regular prostate biopsy.      Elaine Fields

## 2017-10-05 NOTE — PATIENT INSTRUCTIONS
Dr. Selby will contact you with the biopsy results.    It was a pleasure meeting with you today.  Thank you for allowing me and my team the privilege of caring for you today.  YOU are the reason we are here, and I truly hope we provided you with the excellent service you deserve.  Please let us know if there is anything else we can do for you so that we can be sure you are leaving completely satisfied with your care experience.      Myla Duarte, [facility]          [place] for Prostate and Urologic Cancers  Precautions Following a Prostate Biopsy    There are four conditions that you should watch for after a prostate biopsy:    1. Excessive pain  2. Bleeding irregularities (passing numerous  dime sized  clots or if your urine looks like cranberry juice)  3. Fever of 100 degrees or more  4. If you are unable to urinate        If any of these occur, call the Urology Clinic during normal business hours (M-F, 8:00-4:30) at [phone].  If you experience a problem after normal business hours, call our 24-hour phone number at [phone] and ask for the Urology Resident on call to be paged.      If you experience any discomfort following the biopsy, you may take Tylenol.  DO NOT TAKE ASPIRIN unless specified by your physician.   If the discomfort becomes severe or uncontrolled by medication, contact the Urology Clinic or Urology Resident (after normal business hours).      Do not be alarmed if you have some blood in your stool, in your urine, or ejaculate (semen).  This occurrence is normal and may last up to three (3) or four (4) days, usually intermittently.  Blood in the ejaculate (semen) may last several weeks, up to about a dozen ejaculations.  The blood in your ejaculate may appear as brown streaks, blood tinged, and immediately following a biopsy, it may appear bright red.      If you run a fever above 100 degrees, call the Urology Clinic or Urology Resident (after normal business hours) immediately.  If you  are unable to reach your physician or the Resident on call, go to the nearest emergency room.  Explain that you have had a transrectal biopsy of your prostate and what problems you are experiencing.        You should attempt to urinate following your biopsy before you leave the clinic.  If you are unable to urinate four (4) to six (6) hours after you leave the clinic, you will need to contact the Urology Clinic or the Resident on call.  If you are unable to reach your physician or the Resident on call, go to the nearest emergency room.            If you have any questions or concerns after your biopsy, feel free to contact the Urology Clinic at [phone] during M-F, 8:00-5pm business hours.  If you need to speak with someone after normal business hours, call [phone] and ask for the Resident on call to be paged.

## 2017-10-05 NOTE — MR AVS SNAPSHOT
After Visit Summary   10/5/2017    David Astudillo    MRN: [id number]           Patient Information     Date Of Birth          1950        Visit Information        Provider Department      10/5/2017 8:20 AM Adalid Selby MD [facility] Urology and [facility] for Prostate and Urologic Cancers        Today's Diagnoses     Malignant neoplasm of prostate (H)    -  1      Care Instructions    Dr. Selby will contact you with the biopsy results.    It was a pleasure meeting with you today.  Thank you for allowing me and my team the privilege of caring for you today.  YOU are the reason we are here, and I truly hope we provided you with the excellent service you deserve.  Please let us know if there is anything else we can do for you so that we can be sure you are leaving completely satisfied with your care experience.      Myla Duarte, [facility]          [place] for Prostate and Urologic Cancers  Precautions Following a Prostate Biopsy    There are four conditions that you should watch for after a prostate biopsy:    1. Excessive pain  2. Bleeding irregularities (passing numerous  dime sized  clots or if your urine looks like cranberry juice)  3. Fever of 100 degrees or more  4. If you are unable to urinate        If any of these occur, call the Urology Clinic during normal business hours (M-F, 8:00-4:30) at [phone].  If you experience a problem after normal business hours, call our 24-hour phone number at [phone] and ask for the Urology Resident on call to be paged.      If you experience any discomfort following the biopsy, you may take Tylenol.  DO NOT TAKE ASPIRIN unless specified by your physician.   If the discomfort becomes severe or uncontrolled by medication, contact the Urology Clinic or Urology Resident (after normal business hours).      Do not be alarmed if you have some blood in your stool, in your urine, or ejaculate (semen).  This occurrence is normal and may last up to three  (3) or four (4) days, usually intermittently.  Blood in the ejaculate (semen) may last several weeks, up to about a dozen ejaculations.  The blood in your ejaculate may appear as brown streaks, blood tinged, and immediately following a biopsy, it may appear bright red.      If you run a fever above 100 degrees, call the Urology Clinic or Urology Resident (after normal business hours) immediately.  If you are unable to reach your physician or the Resident on call, go to the nearest emergency room.  Explain that you have had a transrectal biopsy of your prostate and what problems you are experiencing.        You should attempt to urinate following your biopsy before you leave the clinic.  If you are unable to urinate four (4) to six (6) hours after you leave the clinic, you will need to contact the Urology Clinic or the Resident on call.  If you are unable to reach your physician or the Resident on call, go to the nearest emergency room.            If you have any questions or concerns after your biopsy, feel free to contact the Urology Clinic at [phone] during M-F, 8:00-5pm business hours.  If you need to speak with someone after normal business hours, call [phone] and ask for the Resident on call to be paged.                Follow-ups after your visit        Your next 10 appointments already scheduled     Feb 02, 2018  1:30 PM CST   (Arrive by 1:15 PM)   HEART TRANSPLANT ANNUAL with Candis Cuadra MD   [facility] ([facility] [place])    [address] [place] [address] [phone]              Who to contact     Please call your clinic at [phone] to:    Ask questions about your health    Make or cancel appointments    Discuss your medicines    Learn about your test results    Speak to your doctor   If you have compliments or concerns about an experience at your clinic, or if you wish to file a complaint, please contact [facility]  "[place] [facility] Patient Relations at [phone] or email us at [email or web address]         Additional Information About Your Visit        [employer] Information     [employer] gives you secure access to your electronic health record. If you see a primary care provider, you can also send messages to your care team and make appointments. If you have questions, please call your primary care clinic.  If you do not have a primary care provider, please call [phone] and they will assist you.      [employer] is an electronic gateway that provides easy, online access to your medical records. With [employer], you can request a clinic appointment, read your test results, renew a prescription or communicate with your care team.     To access your existing account, please contact your [facility] or call [phone] for assistance.        Care EveryWhere ID     This is your Care EveryWhere ID. This could be used by other organizations to access your [place] medical records  [id number]        Your Vitals Were     Pulse Height BMI (Body Mass Index)             109 1.778 m (5' 10\") 31.8 kg/m2          Blood Pressure from Last 3 Encounters:   10/05/17 (!) 144/104   07/06/17 124/88   01/05/17 131/77    Weight from Last 3 Encounters:   10/05/17 100.5 kg (221 lb 9.6 oz)   07/06/17 100.7 kg (222 lb)   01/05/17 99.3 kg (219 lb)              We Performed the Following     BIOPSY PROSTATE NEEDLE/PUNCH     US GUIDE FOR NEEDLE PLACEMENT     US TRANSRECTAL        Primary Care Provider Office Phone # Fax #    Joslyn R Reyes, PA-C [phone]       [facility] [address]  [facility] [address]        Equal Access to Services     SHERRIE MCCANN AH: Hadii aileen espinosa Sojona, waaxda luqadaha, qaybta kaalmada adeegyada, ayden kaur. So [facility] [phone].    ATENCIÓN: Si habla español, tiene a vidal disposición servicios gratuitos de " asistencia lingüística. Franklyn al [phone].    We comply with applicable federal civil rights laws and [place] laws. We do not discriminate on the basis of race, color, national origin, age, disability, sex, sexual orientation, or gender identity.            Thank you!     Thank you for choosing [facility] UROLOGY AND [facility] FOR PROSTATE AND UROLOGIC CANCERS  for your care. Our goal is always to provide you with excellent care. Hearing back from our patients is one way we can continue to improve our services. Please take a few minutes to complete the written survey that you may receive in the mail after your visit with us. Thank you!             Your Updated Medication List - Protect others around you: Learn how to safely use, store and throw away your medicines at www.disposemymeds.org.          This list is accurate as of: 10/5/17  8:56 AM.  Always use your most recent med list.                   Brand Name Dispense Instructions for use Diagnosis    ALPRAZolam 0.5 MG tablet    XANAX     Take 0.5 mg by mouth 3 times daily as needed        AMBIEN 10 MG tablet   Generic drug:  zolpidem      Take by mouth nightly as needed        * aspirin 81 MG tablet      TAKE ONE TABLET BY MOUTH DAILY    Malignant neoplasm of prostate (H)       * aspirin EC 81 MG EC tablet      Take 1 tablet (81 mg) by mouth daily    Chronic systolic heart failure (H)       ciprofloxacin 500 MG tablet    CIPRO    6 tablet    Take 1 tablet (500 mg) by mouth 2 times daily    Urinary tract infection       DAPSONE PO      Take 100 mg by mouth        loratadine 10 MG tablet    CLARITIN    30 tablet    Take 1 tablet by mouth daily.    Congestion       MULTIVITAMIN & MINERAL PO      TAKE ONE TABLET BY MOUTH DAILY    Malignant neoplasm of prostate (H)       MULTIVITAMINS PO      Take 1 tablet by mouth daily        mycophenolate 500 MG tablet    GENERIC EQUIVALENT     TK 1 T PO BID    Malignant neoplasm of prostate (H)       PRAVASTATIN SODIUM PO      Take  40 mg by mouth        sirolimus 1 MG tablet    GENERIC EQUIVALENT     TK ONE T PO ONCE D AT NOON.        sulfamethoxazole-trimethoprim 400-80 MG per tablet    BACTRIM/SEPTRA      Malignant neoplasm of prostate (H)       * tacrolimus 1 MG capsule      TAKE 1 CAPSULE BY MOUTH AS DIRECTED    Malignant neoplasm of prostate (H)       * tacrolimus 1 MG capsule     240 capsule    Take 2mg in the am and 1mg in the pm        * triamcinolone 0.1 % cream    KENALOG      Malignant neoplasm of prostate (H)       * triamcinolone 0.1 % cream    KENALOG     ALVARO EXT AA HS    Malignant neoplasm of prostate (H)       * Notice:  This list has 6 medication(s) that are the same as other medications prescribed for you. Read the directions carefully, and ask your doctor or other care provider to review them with you.

## 2017-10-05 NOTE — NURSING NOTE
Invasive Procedure Safety Checklist:    Procedure:     Action: Complete sections and checkboxes as appropriate.    Pre-procedure:  1. Patient ID Verified with 2 identifiers (Shahla and  or MRN) : YES    2. Procedure and site verified with patient/designee (when able) : YES    3. Accurate consent documentation in medical record : YES    4. H&P (or appropriate assessment) documented in medical record : YES  H&P must be up to 30 days prior to procedure an updated within 24 hours of                 Procedure as applicable.     5. Relevant diagnostic and radiology test results appropriately labeled and displayed as applicable : YES    6. Blood products, implants, devices, and/or special equipment available for the procedure as applicable : YES    7. Procedure site(s) marked with provider initials [Exclusions: N/A] : NO    8. Marking not required. Reason : Yes  Procedure does not require site marking    Time Out:     Time-Out performed immediately prior to starting procedure, including verbal and active participation of all team members addressing: YES    Pt was given Gentamicin prior to procedure. Pt tolerated it well.    1. Correct patient identity.  2. Confirmed that the correct side and site are marked.  3. An accurate procedure to be done.  4. Agreement on the procedure to be done.  5. Correct patient position.  6. Relevant images and results are properly labeled and appropriately displayed.  7. The need to administer antibiotics or fluids for irrigation purposes during the procedure as applicable.  8. Safety precautions based on patient history or medication use.    During Procedure: Verification of correct person, site, and procedure occurs any time the responsibility for care of the patient is transferred to another member of the care team.

## 2017-10-05 NOTE — PROGRESS NOTES
David Astudillo is here for a transrectal US guided biopsy of the prostate for follow up on active surveillance    History of prostate cancer with    The clinical stage is cT1c, initial PSA was 6 and biopsy jorge score is 3+3 in 1 of 12 cores.  Volume of core(s) affected by prostate cancer ranged from 3%.  The prostate size was measured and estimated to be to be 46 grams.  PSA density is 0.13.    Last biopsy was 12/2016, cannot get MRI because of some heart wires after a heart transplant     The risks, benefits, alternatives, and personnel involved in the procudure were discussed.  All questions were answered.  A written informed consent was obtained.      PSA   Date Value Ref Range Status   07/06/2017 7.83 (H) 0 - 4 ug/L Final     Comment:     Assay Method:  Chemiluminescence using Siemens Vista analyzer   11/10/2016 6.03 (H) 0 - 4 ug/L Final     Comment:     Assay Method:  Chemiluminescence using Siemens Vista analyzer   11/16/2012 1.11 0 - 4 ug/L Final     Comment:     PSA results are about 7% lower than our prior method due to a methodology   change   on August 30, 2011.       An enema was completed and 500 mg of Cipro was given prior to the biopsy.  After obtaining informed consent patient was placed in lateral decubitus position.  The ultrasound probe was placed in the rectum.  The prostate was numbed using ultrasound guidance with 1% lidocaine 5 mls along each nerve bundle.  The volume was measured and estimated to be 38.2 grams.  US images were used to guide the biopsies of the prostate.  12 cores were taken with 6 on each side, 2 at the base,  2 at the midgland and  2 at the apex.  The patient tolerated the procedure well.      We will follow up with the results in 7-10 days and contact patient with these results.      Scribe Disclosure:   Kirsty BOURNE, am serving as a scribe; to document services personally performed by Adalid Selby MD based on data collection and the provider's  "statements to me.     Adalid Selby MD  Department of Urology Staff  [facility]    Attestation:  This patient was seen and evaluated by me, with a scribe taking notes.  I have reviewed the note above and agree.  The physical exam was performed by me and the pertinant details are outlined below.     Patient is a 67 year old  male   Vitals: Blood pressure (!) 144/104, pulse 109, height 1.778 m (5' 10\"), weight 100.5 kg (221 lb 9.6 oz).  General Appearance Adult: Alert, no acute distress, oriented      Assessment:re biopsy because he can't get an MRI    Plan:  Follow up with results    Adalid Selby MD  Department of Urology  [facility]    "

## 2017-10-06 LAB — COPATH REPORT: NORMAL

## 2018-01-29 ENCOUNTER — TELEPHONE (OUTPATIENT)
Dept: TRANSPLANT | Facility: CLINIC | Age: 68
End: 2018-01-29

## 2018-01-29 NOTE — LETTER
April 24, 2018    POST-HEART TRANSPLANT APPOINTMENTS    Patient: David Astudillo  MR: [id number]  Coordinator: Arpita JOSEPH   [phone]  : Bebe   [phone]  Date: June 1, 2018      Day/Date: Friday, June 1, 2018  Time Location Activity   10:[facility] am [facility] [address]  Laboratory and Imaging  1st floor Fasting Blood Tests   11:00 am [facility] [address]  Cardiology/Heart Care Clinic  3rd floor Appointment with Dr. Cuadra

## 2018-01-29 NOTE — TELEPHONE ENCOUNTER
ok between 10 and 11 am to visit with Dr Cuadra works for me     From: Bebe Mcconnell <[email or web address]>  Sent: Friday, January 26, 2018 10:24:26 AM  To: Arpita Madden; 'Sera Astudillo'  Cc: Bebe Mcconnell  Subject: RE: Appointment      Hi there. Bebe here.     I saved an appointment for you on Friday, June 1. What do you think?     Sincerely,     Bebe Mcconnell  Transplant   Phone [phone]  Fax [phone]  [email or web address]     From: Arpita Madden   Sent: Friday, January 26, 2018 9:54 AM  To: 'Sera Astudillo'  Cc: Bebe Mcconnell  Subject: RE: Appointment     Sounds good. Will move your appointment to May.     I ve cc d our transplant  Bebe. If you have some dates in mind, let us know sooner than later since Dr. Cuadra is usually booked out a couple of months. She is in clinic on Thursdays and Fridays.      Thanks,   Arpita Madden, RN  Heart Transplant Coordinator     From: Sera Astudillo [[email or web address]]   Sent: Thursday, January 25, 2018 5:36 PM  To: Arpita Madden  Subject: Re: Appointment     thanks yes I am going to Dr cameron mack at [facility] in [place] but I would like to visit with Dr Cuadra when I get back from [place] which will be in may so please keep in touch thanks sera

## 2018-04-30 DIAGNOSIS — R97.20 ELEVATED PROSTATE SPECIFIC ANTIGEN (PSA): Primary | ICD-10-CM

## 2018-05-23 ENCOUNTER — PRE VISIT (OUTPATIENT)
Dept: TRANSPLANT | Facility: CLINIC | Age: 68
End: 2018-05-23

## 2018-06-01 ENCOUNTER — OFFICE VISIT (OUTPATIENT)
Dept: CARDIOLOGY | Facility: CLINIC | Age: 68
End: 2018-06-01
Attending: INTERNAL MEDICINE
Payer: MEDICARE

## 2018-06-01 VITALS
HEART RATE: 94 BPM | OXYGEN SATURATION: 94 % | HEIGHT: 71 IN | DIASTOLIC BLOOD PRESSURE: 97 MMHG | SYSTOLIC BLOOD PRESSURE: 149 MMHG

## 2018-06-01 DIAGNOSIS — Z94.1 HEART REPLACED BY TRANSPLANT (H): Primary | ICD-10-CM

## 2018-06-01 PROCEDURE — G0463 HOSPITAL OUTPT CLINIC VISIT: HCPCS | Mod: 25,ZF

## 2018-06-01 PROCEDURE — 99214 OFFICE O/P EST MOD 30 MIN: CPT | Mod: ZP | Performed by: INTERNAL MEDICINE

## 2018-06-01 ASSESSMENT — PAIN SCALES - GENERAL: PAINLEVEL: NO PAIN (0)

## 2018-06-01 NOTE — PROGRESS NOTES
ADULT HEART TRANSPLANT CLINIC    June 1, 2018    Dear Dr. Eng:      I had the pleasure of seeing David Astudillo in the [facility] today.     As you know, he is a very pleasant 66-year-old man with a history of dilated cardiomyopathy, status post left ventricular assist device as bridge to transplant, who was transplanted in 10/11/2015 at [facility] and has had a relatively uneventful postoperative course.  He has normal graft function and has had no history of rejection.     He is followed o predominantly at the [facility] program but is overall continues to do very well.      He denies any medication intolerances, denies any mouth sores or fevers, chills, night sweats or diarrhea.      Also his cardiac review of systems is negative, and he is able to walk long distances without shortness of breath.  He has been exercising frequently . He denies PND, orthopnea or lower extremity edema, palpitations or syncope.        He did have a weeklong fever earlier this year that was thought to be due to a virus it resolved without any specific treatment.     PAST MEDICAL HISTORY:  Past Medical History:   Diagnosis Date     Anxiety      Atrial fibrillation (H)     Paroxysmal     Cardiomyopathy, idiopathic (H) 11/6/2012     Dyslipidemia      GERD (gastroesophageal reflux disease)      ICD (implantable cardiac defibrillator), dual, in situ 11/6/2012    CRT-D 99% V-pacing     LVAD (left ventricular assist device) present (H)      Nonischemic cardiomyopathy (H)      VALE (obstructive sleep apnea)      Paroxysmal VT (H) 11/6/2012    History of VT ablation x2     Stroke (H)     2004     Thyroid nodule     awaiting FNA   s/p OHT on 10/2015       CURRENT MEDICATIONS:  Prescription Medications as of 6/1/2018             ALPRAZolam (XANAX) 0.5 MG tablet Take 0.5 mg by mouth 3 times daily as needed    aspirin 81 MG tablet TAKE ONE TABLET BY MOUTH DAILY    aspirin EC 81 MG tablet Take 1 tablet (81 mg) by mouth  "daily    ciprofloxacin (CIPRO) 500 MG tablet Take 1 tablet (500 mg) by mouth 2 times daily    DAPSONE PO Take 100 mg by mouth    loratadine (CLARITIN) 10 MG tablet Take 1 tablet by mouth daily.    Multiple Vitamin (MULTIVITAMINS PO) Take 1 tablet by mouth daily    Multiple Vitamins-Minerals (MULTIVITAMIN & MINERAL PO) TAKE ONE TABLET BY MOUTH DAILY    mycophenolate (GENERIC EQUIVALENT) 500 MG tablet TK 1 T PO BID    PRAVASTATIN SODIUM PO Take 40 mg by mouth    PROGRAF (BRAND) 1 MG CAPSULE TAKE 1 CAPSULE BY MOUTH AS DIRECTED    PROGRAF 1 MG PO CAPSULE Take 2mg in the am and 1mg in the pm    sirolimus (RAPAMUNE - GENERIC EQUIVALENT) 1 MG tablet TK ONE T PO ONCE D AT NOON.    sulfamethoxazole-trimethoprim (BACTRIM/SEPTRA) 400-80 MG per tablet     triamcinolone (KENALOG) 0.1 % cream     triamcinolone (KENALOG) 0.1 % cream ALVARO EXT AA HS    zolpidem (AMBIEN) 10 MG tablet Take by mouth nightly as needed          ROS:   Constitutional: No fever, chills, or sweats. No weight gain/loss.   ENT: No visual disturbance, ear ache, epistaxis, sore throat.   Allergies/Immunologic: Negative.   Respiratory: No cough, hemoptysis.   Cardiovascular: As per HPI.   GI: No nausea, vomiting, hematemesis, melena, or hematochezia.   : No urinary frequency, dysuria, or hematuria.   Integument: Negative.   Psychiatric: Negative.   Neuro: Negative.   Endocrinology: Negative.   Musculoskeletal: Negative    Exam  BP (!) 149/97 (BP Location: Left arm, Patient Position: Chair, Cuff Size: Adult Large)  Pulse 94  Ht 1.803 m (5' 11\")  SpO2 94%  In general, the patient is a pleasant male in no apparent distress.    HEENT: NC/AT. PERRLA. EOMI.  Sclerae white, not injected.    Neck:  No adenopathy, No thyromegaly.    COR: No jugular venous distention.  RRR.  Normal S1 S2 splits physiologically.  No murmur, rub click, or gallop.    Lungs:  CTA. No rhonchi.    Abdomen: soft, nontender, nondistended.  No organomegaly.  Extremities:  No clubbing, " cyanosis, or edema.    Neuro: Alert & Oriented x 3, grossly non focal.      Laboratory data from 5/9/2018 sodium 138 potassium 4.3 chloride 100 bicarb 28 glucose 102 creatinine 1.3      Assessment and Plan:  In summary, this is a very pleasant 68-year-old man who is status post orthotopic heart transplant in 10/11/2015, who has had no history of rejection, has stable graft function and normal hemodynamics.  He is overall doing quite well.      Goal tac 8 and 10, with  b.i.d., and rapa 1 mg daily     He remains on a statin and aspirin per protocol.        Change in immunosuppression: no - changed are made through the [facility] program   Reason for Change: N/A  Other Changes: none   Follow-Up: 1 months      Next Biopsy:for cause only   Next Angiogram: 1 year   Next Angiogram with IVUS: yes  Next Stress Test: per protocol     HTN: blood pressure slightly above goal but at goal at home     CKD: stable improved post transplant     Health care maintenance: getting flu shot this week        Candis Cuadra MD       CC  Patient Care Team:  Reyes, Joslyn R, PA-C as PCP - General  Teja, Ann ALCALA MSW as   Aleah Coronado, RN as VAD Coordinator (Nurse)  Dhruv Yao MD as MD (Clinical Cardiac Electrophysiology)  Kaitlynn Lester RN as Nurse Coordinator (Clinical Cardiac Electrophysiology)  Candis Cuadra MD as Cardiologist (Cardiology)  Arpita Madden, ROLLY as Registered Nurse (Cardiology)  Adalid Selby MD as MD (Urology)  Shirin Eng MD as Referring Physician (Cardiology)  HARLAN RASMUSSEN

## 2018-06-01 NOTE — NURSING NOTE
Pt >2.5 years s/p transplant, followed by [facility] in [place], [place]. Pt reports doing very well; living in [place] but still has a home here. No labs/testing needed. Pt would like to continue seeing Dr. Cuadra yearly. Instructions reviewed with pt/daughter who verbalized understanding.     Continue primary follow up at [facility] in [place].    **Return in about 1 year for another visit with Dr. Cuadra. Transplant office will call you to schedule closer to this time.

## 2018-06-01 NOTE — LETTER
6/1/2018      RE: David Astudillo  [address] [place] [address]       Dear Colleague,    Thank you for the opportunity to participate in the care of your patient, David Astudillo, at the [facility] HEART [facility]. Please see a copy of my visit note below.        ADULT HEART TRANSPLANT CLINIC    June 1, 2018    Dear Dr. Eng:      I had the pleasure of seeing David Astudillo in the [facility] today.     As you know, he is a very pleasant 66-year-old man with a history of dilated cardiomyopathy, status post left ventricular assist device as bridge to transplant, who was transplanted in 10/11/2015 at [facility] and has had a relatively uneventful postoperative course.  He has normal graft function and has had no history of rejection.     He is followed o predominantly at the [facility] program but is overall continues to do very well.      He denies any medication intolerances, denies any mouth sores or fevers, chills, night sweats or diarrhea.      Also his cardiac review of systems is negative, and he is able to walk long distances without shortness of breath.  He has been exercising frequently . He denies PND, orthopnea or lower extremity edema, palpitations or syncope.        He did have a weeklong fever earlier this year that was thought to be due to a virus it resolved without any specific treatment.     PAST MEDICAL HISTORY:  Past Medical History:   Diagnosis Date     Anxiety      Atrial fibrillation (H)     Paroxysmal     Cardiomyopathy, idiopathic (H) 11/6/2012     Dyslipidemia      GERD (gastroesophageal reflux disease)      ICD (implantable cardiac defibrillator), dual, in situ 11/6/2012    CRT-D 99% V-pacing     LVAD (left ventricular assist device) present (H)      Nonischemic cardiomyopathy (H)      VALE (obstructive sleep apnea)      Paroxysmal VT (H) 11/6/2012    History of VT ablation x2     Stroke (H)     2004      "Thyroid nodule     awaiting FNA   s/p OHT on 10/2015       CURRENT MEDICATIONS:  Prescription Medications as of 6/1/2018             ALPRAZolam (XANAX) 0.5 MG tablet Take 0.5 mg by mouth 3 times daily as needed    aspirin 81 MG tablet TAKE ONE TABLET BY MOUTH DAILY    aspirin EC 81 MG tablet Take 1 tablet (81 mg) by mouth daily    ciprofloxacin (CIPRO) 500 MG tablet Take 1 tablet (500 mg) by mouth 2 times daily    DAPSONE PO Take 100 mg by mouth    loratadine (CLARITIN) 10 MG tablet Take 1 tablet by mouth daily.    Multiple Vitamin (MULTIVITAMINS PO) Take 1 tablet by mouth daily    Multiple Vitamins-Minerals (MULTIVITAMIN & MINERAL PO) TAKE ONE TABLET BY MOUTH DAILY    mycophenolate (GENERIC EQUIVALENT) 500 MG tablet TK 1 T PO BID    PRAVASTATIN SODIUM PO Take 40 mg by mouth    PROGRAF (BRAND) 1 MG CAPSULE TAKE 1 CAPSULE BY MOUTH AS DIRECTED    PROGRAF 1 MG PO CAPSULE Take 2mg in the am and 1mg in the pm    sirolimus (RAPAMUNE - GENERIC EQUIVALENT) 1 MG tablet TK ONE T PO ONCE D AT NOON.    sulfamethoxazole-trimethoprim (BACTRIM/SEPTRA) 400-80 MG per tablet     triamcinolone (KENALOG) 0.1 % cream     triamcinolone (KENALOG) 0.1 % cream ALVARO EXT AA HS    zolpidem (AMBIEN) 10 MG tablet Take by mouth nightly as needed          ROS:   Constitutional: No fever, chills, or sweats. No weight gain/loss.   ENT: No visual disturbance, ear ache, epistaxis, sore throat.   Allergies/Immunologic: Negative.   Respiratory: No cough, hemoptysis.   Cardiovascular: As per HPI.   GI: No nausea, vomiting, hematemesis, melena, or hematochezia.   : No urinary frequency, dysuria, or hematuria.   Integument: Negative.   Psychiatric: Negative.   Neuro: Negative.   Endocrinology: Negative.   Musculoskeletal: Negative    Exam  BP (!) 149/97 (BP Location: Left arm, Patient Position: Chair, Cuff Size: Adult Large)  Pulse 94  Ht 1.803 m (5' 11\")  SpO2 94%  In general, the patient is a pleasant male in no apparent distress.    HEENT: NC/AT. " PERRLA. EOMI.  Sclerae white, not injected.    Neck:  No adenopathy, No thyromegaly.    COR: No jugular venous distention.  RRR.  Normal S1 S2 splits physiologically.  No murmur, rub click, or gallop.    Lungs:  CTA. No rhonchi.    Abdomen: soft, nontender, nondistended.  No organomegaly.  Extremities:  No clubbing, cyanosis, or edema.    Neuro: Alert & Oriented x 3, grossly non focal.      Laboratory data from 5/9/2018 sodium 138 potassium 4.3 chloride 100 bicarb 28 glucose 102 creatinine 1.3      Assessment and Plan:  In summary, this is a very pleasant 68-year-old man who is status post orthotopic heart transplant in 10/11/2015, who has had no history of rejection, has stable graft function and normal hemodynamics.  He is overall doing quite well.      Goal tac 8 and 10, with  b.i.d., and rapa 1 mg daily     He remains on a statin and aspirin per protocol.        Change in immunosuppression: no - changed are made through the [facility] program   Reason for Change: N/A  Other Changes: none   Follow-Up: 1 months      Next Biopsy:for cause only   Next Angiogram: 1 year   Next Angiogram with IVUS: yes  Next Stress Test: per protocol     HTN: blood pressure slightly above goal but at goal at home     CKD: stable improved post transplant     Health care maintenance: getting flu shot this week        Candis Cuadra MD         Patient Care Team:  Reyes, Joslyn R, PA-C as PCP - General  Teja, Ann ALCALA MSW as   Aleah Coronado RN as VAD Coordinator (Nurse)  Dhruv Yao MD as MD (Clinical Cardiac Electrophysiology)  Kaitlynn Lester, ROLLY as Nurse Coordinator (Clinical Cardiac Electrophysiology)  Candis Cuadra MD as Cardiologist (Cardiology)  Arpita Madden RN as Registered Nurse (Cardiology)  Adalid Selby MD as MD (Urology)  Shirin Eng MD as Referring Physician (Cardiology)  HARLAN RASMUSSEN

## 2018-06-01 NOTE — MR AVS SNAPSHOT
After Visit Summary   6/1/2018    David Astudillo    MRN: [id number]           Patient Information     Date Of Birth          1950        Visit Information        Provider Department      6/1/2018 11:00 AM Candis Cuadra MD [facility]        Care Instructions    Continue primary follow up at [facility] in [place].    **Return in about 1 year for another visit with Dr. Cuadra. Transplant office will call you to schedule closer to this time.           Follow-ups after your visit        Your next 10 appointments already scheduled     Jun 07, 2018 12:30 PM CDT   LAB with  LAB   [facility] ([facility])    [address]  1st Floor  [place] [facility] [place] [address] [phone]           Please do not eat 10-12 hours before your appointment if you are coming in fasting for labs on lipids, cholesterol, or glucose (sugar). This does not apply to pregnant women. Water, hot tea and black coffee (with nothing added) are okay. Do not drink other fluids, diet soda or chew gum.            Jun 07, 2018  1:40 PM CDT   (Arrive by 1:25 PM)   Return Visit with Adalid Selby MD   [facility]rologic [facility] ([facility])    [address] [place] [address]  4th Floor  [facility] [address] [phone]              Who to contact     If you have questions or need follow up information about today's clinic visit or your schedule please contact [facility] directly at [phone].  Normal or non-critical lab and imaging results will be communicated to you by MyChart, letter or phone within 4 business days after the clinic has received the results. If you do not hear from us within 7 days, please contact the clinic through MyChart or phone. If you have a critical or abnormal lab result, we will notify you by phone as soon as possible.  Submit refill requests through [facility] or call your pharmacy and they will  "forward the refill request to us. Please allow 3 business days for your refill to be completed.          Additional Information About Your Visit        [facility] Information     [facility] gives you secure access to your electronic health record. If you see a primary care provider, you can also send messages to your care team and make appointments. If you have questions, please call your primary care clinic.  If you do not have a primary care provider, please call [phone] and they will assist you.        Care EveryWhere ID     This is your Care EveryWhere ID. This could be used by other organizations to access your [place] medical records  [id number]        Your Vitals Were     Pulse Height Pulse Oximetry             94 1.803 m (5' 11\") 94%          Blood Pressure from Last 3 Encounters:   06/01/18 (!) 149/97   10/05/17 (!) 144/104   07/06/17 124/88    Weight from Last 3 Encounters:   10/05/17 100.5 kg (221 lb 9.6 oz)   07/06/17 100.7 kg (222 lb)   01/05/17 99.3 kg (219 lb)              Today, you had the following     No orders found for display       Primary Care Provider Office Phone # Fax #    Joslyn R Reyes, PA-C [phone]       [facility] [address] [facility] [place] [address]        Equal Access to Services     SHERRIE MCCANN : Hadii aad ku hadasho Soomaali, waaxda luqadaha, qaybta kaalmada adeegyada, waxay rupinder hayshirley kaur. So [facility] [phone].    ATENCIÓN: Si habla español, tiene a vidal disposición servicios gratuitos de asistencia lingüística. Llame al [phone].    We comply with applicable federal civil rights laws and [place] laws. We do not discriminate on the basis of race, color, national origin, age, disability, sex, sexual orientation, or gender identity.            Thank you!     Thank you for choosing [facility]  for your care. Our goal is always to provide you with excellent care. Hearing back from our patients is one way we can " continue to improve our services. Please take a few minutes to complete the written survey that you may receive in the mail after your visit with us. Thank you!             Your Updated Medication List - Protect others around you: Learn how to safely use, store and throw away your medicines at www.disposemymeds.org.          This list is accurate as of 6/1/18 11:55 AM.  Always use your most recent med list.                   Brand Name Dispense Instructions for use Diagnosis    ALPRAZolam 0.5 MG tablet    XANAX     Take 0.5 mg by mouth 3 times daily as needed        AMBIEN 10 MG tablet   Generic drug:  zolpidem      Take by mouth nightly as needed        aspirin 81 MG tablet      TAKE ONE TABLET BY MOUTH DAILY    Malignant neoplasm of prostate (H)       ciprofloxacin 500 MG tablet    CIPRO    6 tablet    Take 1 tablet (500 mg) by mouth 2 times daily    Urinary tract infection       DAPSONE PO      Take 100 mg by mouth three times a week        loratadine 10 MG tablet    CLARITIN    30 tablet    Take 1 tablet by mouth daily.    Congestion       MULTIVITAMINS PO      Take 1 tablet by mouth daily        mycophenolate 500 MG tablet    GENERIC EQUIVALENT     Take 500mg by mouth twice daily    Malignant neoplasm of prostate (H)       PRAVASTATIN SODIUM PO      Take 40 mg by mouth daily        sirolimus 1 MG tablet    GENERIC EQUIVALENT     Take 1mg tab once daily by mouth        sulfamethoxazole-trimethoprim 400-80 MG per tablet    BACTRIM/SEPTRA      Malignant neoplasm of prostate (H)       tacrolimus 1 MG capsule     240 capsule    Take 2mg in the am and 1mg in the pm        triamcinolone 0.1 % cream    KENALOG      Malignant neoplasm of prostate (H)

## 2018-06-01 NOTE — NURSING NOTE
Chief Complaint   Patient presents with     Follow Up For     followed by [facility]; 2 year check in on pt status with labs     Vitals were taken and medications were reconciled.  JÚNIOR Fregoso  10:56 AM

## 2018-06-01 NOTE — PATIENT INSTRUCTIONS
Continue primary follow up at [facility] in [place].    **Return in about 1 year for another visit with Dr. Cuadra. Transplant office will call you to schedule closer to this time.

## 2018-06-04 ENCOUNTER — PRE VISIT (OUTPATIENT)
Dept: UROLOGY | Facility: CLINIC | Age: 68
End: 2018-06-04

## 2018-06-07 DIAGNOSIS — R97.20 ELEVATED PROSTATE SPECIFIC ANTIGEN (PSA): ICD-10-CM

## 2018-06-07 LAB — PSA SERPL-MCNC: 9.49 UG/L (ref 0–4)

## 2018-06-14 ENCOUNTER — TELEPHONE (OUTPATIENT)
Dept: UROLOGY | Facility: CLINIC | Age: 68
End: 2018-06-14

## 2018-06-14 ENCOUNTER — TELEPHONE (OUTPATIENT)
Dept: CARDIOLOGY | Facility: CLINIC | Age: 68
End: 2018-06-14

## 2018-06-14 NOTE — TELEPHONE ENCOUNTER
I let patient know that Dr. Selby states that the next steps are having his biopsy sent out for a decipher test. His PSA is 9.49. I sent a message to Kisha Storm RN to give his wife a call @ [phone] go explain the decipher testing and go over patient test results(per patient).    Marianela Munoz MA

## 2018-06-14 NOTE — TELEPHONE ENCOUNTER
Pt with knee problems and outside orthopedic surgeon wanted pt to have an MRI. Pt is aware that MRIs are contraindicated with retained PM leads so does not want one but feels pressured to do so. Confirmed with EP nurse Josselin Dinh that it is not recommended nor is it FDA approved. Pt will communicate this to his surgeon and look at other treatment alternatives--cortisone injections, artificial synovial fluid injections.

## 2018-06-14 NOTE — TELEPHONE ENCOUNTER
[facility] Call Center    Phone Message    May a detailed message be left on voicemail: yes    Reason for Call: Other: Pt calling with some confusion. Dr. Tony Perea at [facility] in [place] wants pt to have an MRI. The doctor who placed his pacemaker didn't recommend it, it says in his permanent comments that the leads are not MRI safe but pt states that the person who actually designed the leads is his son-in-law who swears that they are MRI compatible. Pt would like a call back from the clinic to discuss this.     Action Taken: Message routed to:  [facility] ([facility]  CARDIOVASCULAR CTR

## 2018-06-14 NOTE — TELEPHONE ENCOUNTER
[facility] Call Center    Phone Message    May a detailed message be left on voicemail: yes    Reason for Call: Other: Patient is requesting a call back as soon as possible regarding his labs he got done. Patient wants to know what is going on and what is the next step. Please follow up with patient.     Action Taken: Message routed to:  [facility] ([facility]): Urology

## 2018-07-30 ENCOUNTER — TELEPHONE (OUTPATIENT)
Dept: UROLOGY | Facility: CLINIC | Age: 68
End: 2018-07-30

## 2018-08-23 LAB — LAB SCANNED RESULT: NORMAL

## 2018-08-27 ENCOUNTER — TELEPHONE (OUTPATIENT)
Dept: UROLOGY | Facility: CLINIC | Age: 68
End: 2018-08-27

## 2018-08-27 NOTE — TELEPHONE ENCOUNTER
Patient called again upset about his appt in June where he sat here 6 hours and weight left. His decipher was not done because of patient not meeting the qualification for the test.  Made an appt for sept 13 at 11am. Ashley Knutson LPN Staff Nurse

## 2018-09-11 ENCOUNTER — PRE VISIT (OUTPATIENT)
Dept: UROLOGY | Facility: CLINIC | Age: 68
End: 2018-09-11

## 2018-09-13 ENCOUNTER — OFFICE VISIT (OUTPATIENT)
Dept: UROLOGY | Facility: CLINIC | Age: 68
End: 2018-09-13
Payer: MEDICARE

## 2018-09-13 VITALS
HEART RATE: 100 BPM | BODY MASS INDEX: 30.74 KG/M2 | DIASTOLIC BLOOD PRESSURE: 103 MMHG | WEIGHT: 219.6 LBS | SYSTOLIC BLOOD PRESSURE: 166 MMHG | HEIGHT: 71 IN

## 2018-09-13 DIAGNOSIS — C61 MALIGNANT NEOPLASM OF PROSTATE (H): Primary | ICD-10-CM

## 2018-09-13 ASSESSMENT — PAIN SCALES - GENERAL: PAINLEVEL: NO PAIN (0)

## 2018-09-13 NOTE — LETTER
"9/13/2018       RE: David Astudillo  [address] [facility] [address]     Dear Colleague,    Thank you for referring your patient, David Astudillo, to the [facility] UROLOGY AND INST FOR PROSTATE AND UROLOGIC CANCERS at [facility]. Please see a copy of my visit note below.    David Astudillo is a very pleasant 68 year old  male who presents with a history of Prostate Cancer on active surveillance.     The clinical stage is cT1c, initial PSA was 6 and biopsy jorge score is 3+3 in 1 of 12 cores.  Volume of core(s) affected by prostate cancer ranged from 3%.  The prostate size was measured and estimated to be to be 46 grams.  PSA density is 0.13.    Cannot get MRI because of his heart ICD    He was re-biopsied in 10/2017, pathology demonstrated GS 6 Grade group 1 in 3/12 cores. Volume of cores affected ranged from 5% to 20%.    Tried to send Decipher, but not enough tissue to perform the assay.    With regard to voiding symptoms, pt reports nocturia 2-3x per night at most and occasional urinary urgency. Strong stream, overall satisfied with his voiding.     [facility]    PSA   Date Value Ref Range Status   06/07/2018 9.49 (H) 0 - 4 ug/L Final     Comment:     Assay Method:  Chemiluminescence using Siemens Vista analyzer   07/06/2017 7.83 (H) 0 - 4 ug/L Final     Comment:     Assay Method:  Chemiluminescence using Siemens Vista analyzer   11/10/2016 6.03 (H) 0 - 4 ug/L Final     Comment:     Assay Method:  Chemiluminescence using Siemens Vista analyzer   11/16/2012 1.11 0 - 4 ug/L Final     Comment:     PSA results are about 7% lower than our prior method due to a methodology   change   on August 30, 2011.     PE  Vitals: BP (!) 166/103  Pulse 100  Ht 1.803 m (5' 11\")  Wt 99.6 kg (219 lb 9.6 oz)  BMI 30.63 kg/m2  General Appearance Adult: A&O in NAD     A/P: David Astudillo is a 67 yo M with cT1c prostate cancer, [place] 3+3 in 1 of 12 cores. Repeat biopsy " "confirmed Esvin 3+3 in 3 of 12 cores. Patient does not endorse any new complaints.     - PSA trend is stable, will plan to continue active surveillance  - Repeat PSA in 6 months [facility]  - Briefly discussed medical treatment options for BPH/LUTS, however pt was not interested in this as he is currently satisfied with voiding with minimal bother.         Scribe Disclosure:   I,Kirsty Lomas, am serving as a scribe; to document services personally performed by Adalid Selby MD based on data collection and the provider's statements to me.     Adalid Selby MD  Department of Urology Staff  [facility]    Attestation:  This patient was seen and evaluated by me, with a scribe taking notes.  I have reviewed the note above and agree.  The physical exam and or any procedures were performed by me and the pertinant details are outlined below.     Patient is a 68 year old  male   Vitals: Blood pressure (!) 166/103, pulse 100, height 1.803 m (5' 11\"), weight 99.6 kg (219 lb 9.6 oz).  General Appearance Adult: Alert, no acute distress, oriented    A/P: David Astudillo is a 68 year old  M with cT1c prostate cancer, [place] 3+3 in 1 of 12 cores. Repeat biopsy confirmed Esvin 3+3 in 3 of 12 cores. Patient does not endorse any new complaints. Low PSA density, decipher ineligible given small volume of prostate cancer and MRI ineligible given metal in body    - PSA trend is stable, will plan to continue active surveillance  - Repeat PSA in 6 months [facility]  - Briefly discussed medical treatment options for BPH/LUTS, however pt was not interested in this as he is currently satisfied with voiding with minimal bother.     Adalid Selby MD  Department of Urology  [facility]      "

## 2018-09-13 NOTE — NURSING NOTE
Chief Complaint   Patient presents with     RECHECK     Prostate cancer follow up       Ann Tran MA

## 2018-09-13 NOTE — PROGRESS NOTES
"David Astudillo is a very pleasant 68 year old  male who presents with a history of Prostate Cancer on active surveillance.     The clinical stage is cT1c, initial PSA was 6 and biopsy jorge score is 3+3 in 1 of 12 cores.  Volume of core(s) affected by prostate cancer ranged from 3%.  The prostate size was measured and estimated to be to be 46 grams.  PSA density is 0.13.    Cannot get MRI because of his heart ICD    He was re-biopsied in 10/2017, pathology demonstrated GS 6 Grade group 1 in 3/12 cores. Volume of cores affected ranged from 5% to 20%.    Tried to send Decipher, but not enough tissue to perform the assay.    With regard to voiding symptoms, pt reports nocturia 2-3x per night at most and occasional urinary urgency. Strong stream, overall satisfied with his voiding.     [facility]    PSA   Date Value Ref Range Status   06/07/2018 9.49 (H) 0 - 4 ug/L Final     Comment:     Assay Method:  Chemiluminescence using Siemens Vista analyzer   07/06/2017 7.83 (H) 0 - 4 ug/L Final     Comment:     Assay Method:  Chemiluminescence using Siemens Vista analyzer   11/10/2016 6.03 (H) 0 - 4 ug/L Final     Comment:     Assay Method:  Chemiluminescence using Siemens Vista analyzer   11/16/2012 1.11 0 - 4 ug/L Final     Comment:     PSA results are about 7% lower than our prior method due to a methodology   change   on August 30, 2011.     PE  Vitals: BP (!) 166/103  Pulse 100  Ht 1.803 m (5' 11\")  Wt 99.6 kg (219 lb 9.6 oz)  BMI 30.63 kg/m2  General Appearance Adult: A&O in NAD     A/P: David Astudillo is a 67 yo M with cT1c prostate cancer, [place] 3+3 in 1 of 12 cores. Repeat biopsy confirmed Jorge 3+3 in 3 of 12 cores. Patient does not endorse any new complaints.     - PSA trend is stable, will plan to continue active surveillance  - Repeat PSA in 6 months ([facility]  - Briefly discussed medical treatment options for BPH/LUTS, however pt was not interested in this as he is " "currently satisfied with voiding with minimal bother.         Scribe Disclosure:   I,Mauroopal EscotoAbebe, am serving as a scribe; to document services personally performed by Adalid Selby MD based on data collection and the provider's statements to me.     Adalid Selby MD  Department of Urology [facility]    Attestation:  This patient was seen and evaluated by me, with a scribe taking notes.  I have reviewed the note above and agree.  The physical exam and or any procedures were performed by me and the pertinant details are outlined below.     Patient is a 68 year old  male   Vitals: Blood pressure (!) 166/103, pulse 100, height 1.803 m (5' 11\"), weight 99.6 kg (219 lb 9.6 oz).  General Appearance Adult: Alert, no acute distress, oriented    A/P: David Astudillo is a 68 year old  M with cT1c prostate cancer, [place] 3+3 in 1 of 12 cores. Repeat biopsy confirmed Esvin 3+3 in 3 of 12 cores. Patient does not endorse any new complaints. Low PSA density, decipher ineligible given small volume of prostate cancer and MRI ineligible given metal in body    - PSA trend is stable, will plan to continue active surveillance  - Repeat PSA in 6 months [facility]  - Briefly discussed medical treatment options for BPH/LUTS, however pt was not interested in this as he is currently satisfied with voiding with minimal bother.     Adalid Selby MD  Department of Urology  [facility]    "

## 2018-09-13 NOTE — MR AVS SNAPSHOT
"              After Visit Summary   9/13/2018    David Astudillo    MRN: [id number]           Patient Information     Date Of Birth          1950        Visit Information        Provider Department      9/13/2018 11:00 AM Sola, Adalid Paulino MD [facility] Urology and [facility] for Prostate and Urologic Cancers        Today's Diagnoses     Malignant neoplasm of prostate (H)    -  1      Care Instructions    Follow-up in 6 months with a PSA.  PSA in 3 months in [place]          Follow-ups after your visit        Who to contact     Please call your clinic at [phone] to:    Ask questions about your health    Make or cancel appointments    Discuss your medicines    Learn about your test results    Speak to your doctor            Additional Information About Your Visit        [employer] Information     [facility] gives you secure access to your electronic health record. If you see a primary care provider, you can also send messages to your care team and make appointments. If you have questions, please call your primary care clinic.  If you do not have a primary care provider, please call [phone] and they will assist you.      [facility] is an electronic gateway that provides easy, online access to your medical records. With [facility], you can request a clinic appointment, read your test results, renew a prescription or communicate with your care team.     To access your existing account, please contact your [facility] or call [phone] for assistance.        Care EveryWhere ID     This is your Care EveryWhere ID. This could be used by other organizations to access your [facility] medical records  [id number]        Your Vitals Were     Pulse Height BMI (Body Mass Index)             100 1.803 m (5' 11\") 30.63 kg/m2          Blood Pressure from Last 3 Encounters:   09/13/18 (!) 166/103   06/01/18 (!) 149/97   10/05/17 (!) 144/104    Weight from Last 3 Encounters:   09/13/18 99.6 kg (219 lb " 9.6 oz)   10/05/17 100.5 kg (221 lb 9.6 oz)   07/06/17 100.7 kg (222 lb)              Today, you had the following     No orders found for display       Primary Care Provider Office Phone # Fax #    Joslyn R Reyes, PA-C [phone]       [facility] [address]  [facility] [address]        Equal Access to Services     SHERRIE MCCANN : Hadii aad ku hadasho Soomaali, waaxda luqadaha, qaybta kaalmada adeegyada, waxay idiin hayaan adeeg kharash laizaiah . So [facility] [phone].    ATENCIÓN: Si habla espdominga, tiene a vidal disposición servicios gratuitos de asistencia lingüística. Noyame al [phone].    We comply with applicable federal civil rights laws and [place] laws. We do not discriminate on the basis of race, color, national origin, age, disability, sex, sexual orientation, or gender identity.            Thank you!     Thank you for choosing [facility] UROLOGY AND [facility] FOR PROSTATE AND UROLOGIC CANCERS  for your care. Our goal is always to provide you with excellent care. Hearing back from our patients is one way we can continue to improve our services. Please take a few minutes to complete the written survey that you may receive in the mail after your visit with us. Thank you!             Your Updated Medication List - Protect others around you: Learn how to safely use, store and throw away your medicines at www.disposemymeds.org.          This list is accurate as of 9/13/18 12:09 PM.  Always use your most recent med list.                   Brand Name Dispense Instructions for use Diagnosis    ALPRAZolam 0.5 MG tablet    XANAX     Take 0.5 mg by mouth 3 times daily as needed        AMBIEN 10 MG tablet   Generic drug:  zolpidem      Take by mouth nightly as needed        aspirin 81 MG tablet      TAKE ONE TABLET BY MOUTH DAILY    Malignant neoplasm of prostate (H)       ciprofloxacin 500 MG tablet    CIPRO    6 tablet    Take 1 tablet (500 mg) by mouth 2 times daily    Urinary tract  infection       DAPSONE PO      Take 100 mg by mouth three times a week        loratadine 10 MG tablet    CLARITIN    30 tablet    Take 1 tablet by mouth daily.    Congestion       MULTIVITAMINS PO      Take 1 tablet by mouth daily        mycophenolate 500 MG tablet    GENERIC EQUIVALENT     Take 500mg by mouth twice daily    Malignant neoplasm of prostate (H)       PRAVASTATIN SODIUM PO      Take 40 mg by mouth daily        sirolimus 1 MG tablet    GENERIC EQUIVALENT     Take 1mg tab once daily by mouth        sulfamethoxazole-trimethoprim 400-80 MG per tablet    BACTRIM/SEPTRA      Malignant neoplasm of prostate (H)       tacrolimus 1 MG capsule     240 capsule    Take 2mg in the am and 1mg in the pm        triamcinolone 0.1 % cream    KENALOG      Malignant neoplasm of prostate (H)

## 2019-01-01 ENCOUNTER — TELEPHONE (OUTPATIENT)
Dept: UROLOGY | Facility: CLINIC | Age: 69
End: 2019-01-01

## 2019-01-01 ENCOUNTER — PRE VISIT (OUTPATIENT)
Dept: UROLOGY | Facility: CLINIC | Age: 69
End: 2019-01-01

## 2019-01-01 ENCOUNTER — OFFICE VISIT (OUTPATIENT)
Dept: UROLOGY | Facility: CLINIC | Age: 69
End: 2019-01-01
Payer: MEDICARE

## 2019-01-01 VITALS
WEIGHT: 217 LBS | BODY MASS INDEX: 31.07 KG/M2 | SYSTOLIC BLOOD PRESSURE: 158 MMHG | DIASTOLIC BLOOD PRESSURE: 98 MMHG | HEIGHT: 70 IN | HEART RATE: 93 BPM

## 2019-01-01 DIAGNOSIS — C61 MALIGNANT NEOPLASM OF PROSTATE (H): Primary | ICD-10-CM

## 2019-01-01 DIAGNOSIS — R35.0 URINARY FREQUENCY: ICD-10-CM

## 2019-01-01 DIAGNOSIS — C61 MALIGNANT NEOPLASM OF PROSTATE (H): ICD-10-CM

## 2019-01-01 DIAGNOSIS — R35.0 URINARY FREQUENCY: Primary | ICD-10-CM

## 2019-01-01 LAB — PSA SERPL-MCNC: 9.22 UG/L (ref 0–4)

## 2019-01-01 RX ORDER — TAMSULOSIN HYDROCHLORIDE 0.4 MG/1
0.4 CAPSULE ORAL DAILY
Qty: 90 CAPSULE | Refills: 3 | Status: SHIPPED | OUTPATIENT
Start: 2019-01-01

## 2019-01-01 RX ORDER — TAMSULOSIN HYDROCHLORIDE 0.4 MG/1
CAPSULE ORAL
Qty: 90 CAPSULE | Refills: 3 | OUTPATIENT
Start: 2019-01-01

## 2019-01-01 RX ORDER — TAMSULOSIN HYDROCHLORIDE 0.4 MG/1
0.4 CAPSULE ORAL DAILY
Qty: 30 CAPSULE | Refills: 3 | Status: SHIPPED | OUTPATIENT
Start: 2019-01-01 | End: 2019-01-01

## 2019-01-01 ASSESSMENT — MIFFLIN-ST. JEOR: SCORE: 1760.56

## 2019-01-01 ASSESSMENT — PAIN SCALES - GENERAL: PAINLEVEL: NO PAIN (0)

## 2019-05-02 NOTE — TELEPHONE ENCOUNTER
Chief Complaint :PSA check      New Hx/Sx: prostate cancer     Records/Orders/Proced: PSA is before appointment     Pt Contacted: no    At Rooming: normal

## 2019-05-16 NOTE — PATIENT INSTRUCTIONS
Please follow up in a year with a PSA before         It was a pleasure meeting with you today.  Thank you for allowing me and my team the privilege of caring for you today.  YOU are the reason we are here, and I truly hope we provided you with the excellent service you deserve.  Please let us know if there is anything else we can do for you so that we can be sure you are leaving completely satisfied with your care experience.

## 2019-05-16 NOTE — LETTER
2019     RE: David Astudillo  [address]   [place] [address]     Dear Colleague,    Thank you for referring your patient, David Astudillo, to the [facility] UROLOGY AND INST FOR PROSTATE AND UROLOGIC CANCERS at [facility]. Please see a copy of my visit note below.      Urology Clinic    Adalid Selby MD  Date of Service: 2019     Name: David Astudillo  MRN: [id number]  Age: 69 year old  : 1950  Referring provider: Referred Self     Assessment and Plan:    1. Prostate Cancer -- currently on active surveillance:   The clinical stage is cT1c, initial PSA was 6 and biopsy jorge score is 3+3 in 1 of 12 cores. Volume of core(s) affected by prostate cancer ranged from 3%. The prostate size was measured and estimated to be to be 46 grams. PSA density is 0.13. He was re-biopsied in 10/2017, pathology demonstrated GS 6 Grade group 1 in 3/12 cores. Volume of cores affected ranged from 5% to 20%. Tried to send Decipher, but not enough tissue to perform the assay. Cannot get MRI because of his heart ICD. PSA level reassuring at 9.2 today, which is down from 9.5 on 2018.   --Continue on active surveillance   --Recheck PSA in November (in [place], which he will send to me once done)  --If subsequent PSA's rise, may consider biopsy at that time    2. Nocturia/urinary urgency:   Patient reports getting up 2-3 times a night to void. I held a risk benefit conversation with him regarding starting medication for this (for quality of life purposes) and he is not yet at the point where he would like to go on medication. He will let me know if this changes.  --Discussed reducing alcohol and caffeine intake as needed      Plan:  Recheck PSA in November (in [place], which he will send to me once done)        Attestation:  This patient was seen and evaluated by me, with a scribe taking notes.  I have reviewed the note above and agree.  The physical exam and or  "any procedures were performed by me and the pertinant details are outlined below.       Adalid Selby MD  Department of Urology  [facility]  ______________________________________________________________________    HPI  David Astudillo is a 69 year old male with a history of prostate cancer on active surveillance. Overall doing well. He continues to struggle with nocturia x 2-3, which has been ongoing for about a year. He also develops urgency when he has a beer or two where he has to urinate every 15 minutes. He is not currently taking medication for this. He is otherwise doing well. Stays active. He follows closely with his cardiologist for his heart issues. 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     Review of Systems:   Pertinent items are noted in HPI or as below, remainder of complete ROS is negative.      Physical Exam:   Patient is a 69 year old  male   Vitals: Blood pressure (!) 177/108, pulse 98, height 1.778 m (5' 10\"), weight 98.4 kg (217 lb).  General Appearance Adult: Alert, no acute distress, oriented  HENT: throat/mouth:normal, good dentition  Lungs: no respiratory distress, or pursed lip breathing  Heart: No obvious jugular venous distension present  Abdomen: Body mass index is 31.14 kg/m .  Musculoskeltal: extremities normal  Skin: no visible suspicious lesions or rashes  Neuro: Alert, oriented, speech and mentation normal  Psych: affect and mood normal  Gait: Normal  : deferred    Laboratory:   PSA   Date Value Ref Range Status   05/16/2019 9.22 (H) 0 - 4 ug/L Final     Comment:     Assay Method:  Chemiluminescence using Siemens Vista analyzer   06/07/2018 9.49 (H) 0 - 4 ug/L Final     Comment:     Assay Method:  Chemiluminescence using Siemens Vista analyzer   07/06/2017 7.83 (H) 0 - 4 ug/L Final     Comment:     Assay Method:  Chemiluminescence using Siemens Vista analyzer   11/10/2016 6.03 (H) 0 - 4 ug/L Final     Comment: "     Assay Method:  Chemiluminescence using Siemens Vista analyzer   11/16/2012 1.11 0 - 4 ug/L Final     Comment:     PSA results are about 7% lower than our prior method due to a methodology   change   on August 30, 2011.     Scribe Disclosure:  I, Alla Brown, am serving as a scribe to document services personally performed by Adalid Selby MD at this visit, based upon the provider's statements to me. All documentation has been reviewed by the aforementioned provider prior to being entered into the official medical record.     Again, thank you for allowing me to participate in the care of your patient.      Sincerely,    Adalid Selby MD

## 2019-05-16 NOTE — PROGRESS NOTES
Urology Clinic    Adalid Selby MD  Date of Service: 2019     Name: David Astudillo  MRN: [id number]  Age: 69 year old  : 1950  Referring provider: Referred Self     Assessment and Plan:    1. Prostate Cancer -- currently on active surveillance:   The clinical stage is cT1c, initial PSA was 6 and biopsy jorge score is 3+3 in 1 of 12 cores. Volume of core(s) affected by prostate cancer ranged from 3%. The prostate size was measured and estimated to be to be 46 grams. PSA density is 0.13. He was re-biopsied in 10/2017, pathology demonstrated GS 6 Grade group 1 in 3/12 cores. Volume of cores affected ranged from 5% to 20%. Tried to send Decipher, but not enough tissue to perform the assay. Cannot get MRI because of his heart ICD. PSA level reassuring at 9.2 today, which is down from 9.5 on 2018.   --Continue on active surveillance   --Recheck PSA in November (in [place], which he will send to me once done)  --If subsequent PSA's rise, may consider biopsy at that time    2. Nocturia/urinary urgency:   Patient reports getting up 2-3 times a night to void. I held a risk benefit conversation with him regarding starting medication for this (for quality of life purposes) and he is not yet at the point where he would like to go on medication. He will let me know if this changes.  --Discussed reducing alcohol and caffeine intake as needed      Plan:  Recheck PSA in November (in [place], which he will send to me once done)        Attestation:  This patient was seen and evaluated by me, with a scribe taking notes.  I have reviewed the note above and agree.  The physical exam and or any procedures were performed by me and the pertinant details are outlined below.       Adalid Selby MD  Department of Urology  [facility]  ______________________________________________________________________    HPI  David Astudillo is a 69 year old male with a history of prostate cancer on active  "surveillance. Overall doing well. He continues to struggle with nocturia x 2-3, which has been ongoing for about a year. He also develops urgency when he has a beer or two where he has to urinate every 15 minutes. He is not currently taking medication for this. He is otherwise doing well. Stays active. He follows closely with his cardiologist for his heart issues. 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     Review of Systems:   Pertinent items are noted in HPI or as below, remainder of complete ROS is negative.      Physical Exam:   Patient is a 69 year old  male   Vitals: Blood pressure (!) 177/108, pulse 98, height 1.778 m (5' 10\"), weight 98.4 kg (217 lb).  General Appearance Adult: Alert, no acute distress, oriented  HENT: throat/mouth:normal, good dentition  Lungs: no respiratory distress, or pursed lip breathing  Heart: No obvious jugular venous distension present  Abdomen: Body mass index is 31.14 kg/m .  Musculoskeltal: extremities normal  Skin: no visible suspicious lesions or rashes  Neuro: Alert, oriented, speech and mentation normal  Psych: affect and mood normal  Gait: Normal  : deferred    Laboratory:   PSA   Date Value Ref Range Status   05/16/2019 9.22 (H) 0 - 4 ug/L Final     Comment:     Assay Method:  Chemiluminescence using Siemens Vista analyzer   06/07/2018 9.49 (H) 0 - 4 ug/L Final     Comment:     Assay Method:  Chemiluminescence using Siemens Vista analyzer   07/06/2017 7.83 (H) 0 - 4 ug/L Final     Comment:     Assay Method:  Chemiluminescence using Siemens Vista analyzer   11/10/2016 6.03 (H) 0 - 4 ug/L Final     Comment:     Assay Method:  Chemiluminescence using Siemens Vista analyzer   11/16/2012 1.11 0 - 4 ug/L Final     Comment:     PSA results are about 7% lower than our prior method due to a methodology   change   on August 30, 2011.       Scribe Disclosure:  I, Alla Brown, am serving as a scribe to document services " personally performed by Adalid Selby MD at this visit, based upon the provider's statements to me. All documentation has been reviewed by the aforementioned provider prior to being entered into the official medical record.

## 2019-08-02 NOTE — TELEPHONE ENCOUNTER
Patient called and wants dr whatley to be aware of the increase in his psa and also his frequency at night  Message sent to weight to decide on plan of action Ashley Knutson, MARY ELLENN Staff Nurse

## 2019-08-02 NOTE — TELEPHONE ENCOUNTER
[facility] Call Center    Phone Message    May a detailed message be left on voicemail: yes    Reason for Call: Symptoms or Concerns     If patient has red-flag symptoms, warm transfer to triage line    Current symptom or concern: Elevated PSA    Symptoms have been present for:  3 day(s)    Has patient previously been seen for this? Yes    By: Dr. Cordoba Weight    Date: Last seen 5/16/2019    Are there any new or worsening symptoms? Yes: Pt states he had a recent [facility] 10.[facility] but that his provider told him to see his urologist. Next available was 9/30, and pt does not want to see another provider in clinic. Please advise.    Action Taken: Message routed to:  [facility] Urology

## 2019-08-22 NOTE — TELEPHONE ENCOUNTER
----- Message from Adalid Selby MD sent at 8/20/2019  3:09 PM CDT -----  Okay to prescribe flomax 0.4 mg oral daily    Rafita    ----- Message -----  From: Ashley Knutson LPN  Sent: 8/2/2019   3:28 PM  To: Adalid Selby MD    Patient called and his recent psa is 10 and you told him to get this done in November he did it in July  And he is gone in October and wants to see you asap  He is getting up at night sometimes 8 times per night not always wants meds for this??? Do you need to see him soon ??? Ashley Knutson LPN Staff Nurse

## 2019-08-22 NOTE — TELEPHONE ENCOUNTER
Patient needs to discuss with his heart transplant specialist in [place] Ashley Knutson LPN Staff Nurse

## 2019-08-30 NOTE — TELEPHONE ENCOUNTER
[facility] Call Center    Phone Message    May a detailed message be left on voicemail: yes    Reason for Call: Other: Robby returning call. Please call him back.     Action Taken: Message routed to:  [facility] genaro mercedes

## 2019-09-05 NOTE — TELEPHONE ENCOUNTER
Called patient and gave him the message to return in one year with psa Ashley Knutson, LPN Staff Nurse

## 2019-09-05 NOTE — TELEPHONE ENCOUNTER
----- Message from Adalid Selby MD sent at 9/4/2019 10:06 PM CDT -----  Can see him next year with PSA and clinic visit    Chrsi    ----- Message -----  From: Ashley Knutson LPN  Sent: 8/30/2019   3:21 PM  To: Adalid Selby MD    Patient 's last psa was 10 do you need to see him Ashley Knutson LPN Staff Nurse

## 2020-01-01 ENCOUNTER — TRANSFERRED RECORDS (OUTPATIENT)
Dept: HEALTH INFORMATION MANAGEMENT | Facility: CLINIC | Age: 70
End: 2020-01-01

## 2020-03-02 ENCOUNTER — HEALTH MAINTENANCE LETTER (OUTPATIENT)
Age: 70
End: 2020-03-02

## 2020-05-12 ENCOUNTER — PRE VISIT (OUTPATIENT)
Dept: UROLOGY | Facility: CLINIC | Age: 70
End: 2020-05-12

## 2020-05-12 NOTE — TELEPHONE ENCOUNTER
"Left voicemail informing the patient that the clinic is converting appointments to Video Visits due to COVID-19. Patient was instructed to call the clinic back at [phone].        When the patient calls the clinic back please convert their appointment with  to a Video or Telephone appointment. they will need to download the application \"[employer]\". The patient will receive another call prior to their appointment with further instructions.      Patient need PSA before this appointment   "

## (undated) RX ORDER — GENTAMICIN 40 MG/ML
INJECTION, SOLUTION INTRAMUSCULAR; INTRAVENOUS
Status: DISPENSED
Start: 2017-10-05

## (undated) RX ORDER — LIDOCAINE HYDROCHLORIDE 10 MG/ML
INJECTION, SOLUTION INFILTRATION; PERINEURAL
Status: DISPENSED
Start: 2017-10-05